# Patient Record
Sex: FEMALE | Race: WHITE | NOT HISPANIC OR LATINO | Employment: FULL TIME | URBAN - METROPOLITAN AREA
[De-identification: names, ages, dates, MRNs, and addresses within clinical notes are randomized per-mention and may not be internally consistent; named-entity substitution may affect disease eponyms.]

---

## 2021-12-11 ENCOUNTER — APPOINTMENT (EMERGENCY)
Dept: RADIOLOGY | Facility: HOSPITAL | Age: 61
End: 2021-12-11
Payer: COMMERCIAL

## 2021-12-11 ENCOUNTER — HOSPITAL ENCOUNTER (EMERGENCY)
Facility: HOSPITAL | Age: 61
Discharge: HOME | End: 2021-12-11
Attending: EMERGENCY MEDICINE
Payer: COMMERCIAL

## 2021-12-11 VITALS
DIASTOLIC BLOOD PRESSURE: 51 MMHG | BODY MASS INDEX: 22.13 KG/M2 | OXYGEN SATURATION: 100 % | SYSTOLIC BLOOD PRESSURE: 99 MMHG | WEIGHT: 141 LBS | TEMPERATURE: 98.6 F | RESPIRATION RATE: 16 BRPM | HEART RATE: 64 BPM | HEIGHT: 67 IN

## 2021-12-11 DIAGNOSIS — S42.291A CLOSED FRACTURE OF HEAD OF RIGHT HUMERUS, INITIAL ENCOUNTER: Primary | ICD-10-CM

## 2021-12-11 LAB
2HR DELTA HS TROPONIN: 0 NG/L
ALBUMIN SERPL BCP-MCNC: 4.5 G/DL (ref 3.5–5)
ALP SERPL-CCNC: 70 U/L (ref 46–116)
ALT SERPL W P-5'-P-CCNC: 26 U/L (ref 12–78)
ANION GAP SERPL CALCULATED.3IONS-SCNC: 7 MMOL/L (ref 4–13)
AST SERPL W P-5'-P-CCNC: 18 U/L (ref 5–45)
ATRIAL RATE: 64 BPM
BASOPHILS # BLD AUTO: 0.04 THOUSANDS/ΜL (ref 0–0.1)
BASOPHILS NFR BLD AUTO: 0 % (ref 0–1)
BILIRUB SERPL-MCNC: 1.53 MG/DL (ref 0.2–1)
BUN SERPL-MCNC: 11 MG/DL (ref 5–25)
CALCIUM SERPL-MCNC: 10.1 MG/DL (ref 8.3–10.1)
CARDIAC TROPONIN I PNL SERPL HS: 2 NG/L
CARDIAC TROPONIN I PNL SERPL HS: 2 NG/L
CHLORIDE SERPL-SCNC: 108 MMOL/L (ref 100–108)
CO2 SERPL-SCNC: 25 MMOL/L (ref 21–32)
CREAT SERPL-MCNC: 0.92 MG/DL (ref 0.6–1.3)
EOSINOPHIL # BLD AUTO: 0 THOUSAND/ΜL (ref 0–0.61)
EOSINOPHIL NFR BLD AUTO: 0 % (ref 0–6)
ERYTHROCYTE [DISTWIDTH] IN BLOOD BY AUTOMATED COUNT: 12.6 % (ref 11.6–15.1)
GFR SERPL CREATININE-BSD FRML MDRD: 67 ML/MIN/1.73SQ M
GLUCOSE SERPL-MCNC: 109 MG/DL (ref 65–140)
HCT VFR BLD AUTO: 42.2 % (ref 34.8–46.1)
HGB BLD-MCNC: 14.4 G/DL (ref 11.5–15.4)
IMM GRANULOCYTES # BLD AUTO: 0.04 THOUSAND/UL (ref 0–0.2)
IMM GRANULOCYTES NFR BLD AUTO: 0 % (ref 0–2)
LYMPHOCYTES # BLD AUTO: 0.63 THOUSANDS/ΜL (ref 0.6–4.47)
LYMPHOCYTES NFR BLD AUTO: 6 % (ref 14–44)
MCH RBC QN AUTO: 30.1 PG (ref 26.8–34.3)
MCHC RBC AUTO-ENTMCNC: 34.1 G/DL (ref 31.4–37.4)
MCV RBC AUTO: 88 FL (ref 82–98)
MONOCYTES # BLD AUTO: 0.56 THOUSAND/ΜL (ref 0.17–1.22)
MONOCYTES NFR BLD AUTO: 5 % (ref 4–12)
NEUTROPHILS # BLD AUTO: 10 THOUSANDS/ΜL (ref 1.85–7.62)
NEUTS SEG NFR BLD AUTO: 89 % (ref 43–75)
NRBC BLD AUTO-RTO: 0 /100 WBCS
P AXIS: 72 DEGREES
PLATELET # BLD AUTO: 267 THOUSANDS/UL (ref 149–390)
PMV BLD AUTO: 10.1 FL (ref 8.9–12.7)
POTASSIUM SERPL-SCNC: 3.7 MMOL/L (ref 3.5–5.3)
PR INTERVAL: 174 MS
PROT SERPL-MCNC: 7.8 G/DL (ref 6.4–8.2)
QRS AXIS: 22 DEGREES
QRSD INTERVAL: 66 MS
QT INTERVAL: 396 MS
QTC INTERVAL: 408 MS
RBC # BLD AUTO: 4.79 MILLION/UL (ref 3.81–5.12)
SODIUM SERPL-SCNC: 140 MMOL/L (ref 136–145)
T WAVE AXIS: 62 DEGREES
VENTRICULAR RATE: 64 BPM
WBC # BLD AUTO: 11.27 THOUSAND/UL (ref 4.31–10.16)

## 2021-12-11 PROCEDURE — 93005 ELECTROCARDIOGRAM TRACING: CPT

## 2021-12-11 PROCEDURE — 99284 EMERGENCY DEPT VISIT MOD MDM: CPT

## 2021-12-11 PROCEDURE — 84484 ASSAY OF TROPONIN QUANT: CPT | Performed by: EMERGENCY MEDICINE

## 2021-12-11 PROCEDURE — 96374 THER/PROPH/DIAG INJ IV PUSH: CPT

## 2021-12-11 PROCEDURE — 93010 ELECTROCARDIOGRAM REPORT: CPT | Performed by: INTERNAL MEDICINE

## 2021-12-11 PROCEDURE — 73060 X-RAY EXAM OF HUMERUS: CPT

## 2021-12-11 PROCEDURE — 80053 COMPREHEN METABOLIC PANEL: CPT | Performed by: EMERGENCY MEDICINE

## 2021-12-11 PROCEDURE — 73030 X-RAY EXAM OF SHOULDER: CPT

## 2021-12-11 PROCEDURE — 36415 COLL VENOUS BLD VENIPUNCTURE: CPT | Performed by: EMERGENCY MEDICINE

## 2021-12-11 PROCEDURE — NC001 PR NO CHARGE: Performed by: ORTHOPAEDIC SURGERY

## 2021-12-11 PROCEDURE — 99285 EMERGENCY DEPT VISIT HI MDM: CPT | Performed by: EMERGENCY MEDICINE

## 2021-12-11 PROCEDURE — 85025 COMPLETE CBC W/AUTO DIFF WBC: CPT | Performed by: EMERGENCY MEDICINE

## 2021-12-11 RX ORDER — FENTANYL CITRATE 50 UG/ML
50 INJECTION, SOLUTION INTRAMUSCULAR; INTRAVENOUS ONCE
Status: COMPLETED | OUTPATIENT
Start: 2021-12-11 | End: 2021-12-11

## 2021-12-11 RX ADMIN — FENTANYL CITRATE 50 MCG: 50 INJECTION INTRAMUSCULAR; INTRAVENOUS at 06:35

## 2021-12-16 ENCOUNTER — TELEPHONE (OUTPATIENT)
Dept: OBGYN CLINIC | Facility: CLINIC | Age: 61
End: 2021-12-16

## 2021-12-19 DIAGNOSIS — M25.511 CHRONIC RIGHT SHOULDER PAIN: Primary | ICD-10-CM

## 2021-12-19 DIAGNOSIS — G89.29 CHRONIC RIGHT SHOULDER PAIN: Primary | ICD-10-CM

## 2021-12-23 ENCOUNTER — HOSPITAL ENCOUNTER (OUTPATIENT)
Dept: RADIOLOGY | Facility: HOSPITAL | Age: 61
Discharge: HOME | End: 2021-12-23
Attending: ORTHOPAEDIC SURGERY
Payer: COMMERCIAL

## 2021-12-23 ENCOUNTER — OFFICE VISIT (OUTPATIENT)
Dept: OBGYN CLINIC | Facility: HOSPITAL | Age: 61
End: 2021-12-23
Payer: COMMERCIAL

## 2021-12-23 ENCOUNTER — TELEPHONE (OUTPATIENT)
Dept: OBGYN CLINIC | Facility: HOSPITAL | Age: 61
End: 2021-12-23

## 2021-12-23 VITALS
HEIGHT: 67 IN | SYSTOLIC BLOOD PRESSURE: 138 MMHG | BODY MASS INDEX: 22.13 KG/M2 | DIASTOLIC BLOOD PRESSURE: 93 MMHG | HEART RATE: 66 BPM | WEIGHT: 141 LBS

## 2021-12-23 DIAGNOSIS — S42.291A CLOSED FRACTURE OF HEAD OF RIGHT HUMERUS, INITIAL ENCOUNTER: Primary | ICD-10-CM

## 2021-12-23 DIAGNOSIS — M25.511 CHRONIC RIGHT SHOULDER PAIN: ICD-10-CM

## 2021-12-23 DIAGNOSIS — G89.29 CHRONIC RIGHT SHOULDER PAIN: ICD-10-CM

## 2021-12-23 PROCEDURE — 99203 OFFICE O/P NEW LOW 30 MIN: CPT | Performed by: ORTHOPAEDIC SURGERY

## 2021-12-23 PROCEDURE — 73030 X-RAY EXAM OF SHOULDER: CPT

## 2022-01-16 DIAGNOSIS — S42.291A CLOSED FRACTURE OF HEAD OF RIGHT HUMERUS, INITIAL ENCOUNTER: Primary | ICD-10-CM

## 2022-01-19 ENCOUNTER — OFFICE VISIT (OUTPATIENT)
Dept: OBGYN CLINIC | Facility: HOSPITAL | Age: 62
End: 2022-01-19
Payer: COMMERCIAL

## 2022-01-19 ENCOUNTER — HOSPITAL ENCOUNTER (OUTPATIENT)
Dept: RADIOLOGY | Facility: HOSPITAL | Age: 62
Discharge: HOME | End: 2022-01-19
Attending: ORTHOPAEDIC SURGERY
Payer: COMMERCIAL

## 2022-01-19 VITALS
SYSTOLIC BLOOD PRESSURE: 144 MMHG | WEIGHT: 141 LBS | HEART RATE: 61 BPM | HEIGHT: 67 IN | BODY MASS INDEX: 22.13 KG/M2 | DIASTOLIC BLOOD PRESSURE: 81 MMHG

## 2022-01-19 DIAGNOSIS — S42.291A CLOSED FRACTURE OF HEAD OF RIGHT HUMERUS, INITIAL ENCOUNTER: ICD-10-CM

## 2022-01-19 DIAGNOSIS — S42.291A CLOSED FRACTURE OF HEAD OF RIGHT HUMERUS, INITIAL ENCOUNTER: Primary | ICD-10-CM

## 2022-01-19 PROCEDURE — 99213 OFFICE O/P EST LOW 20 MIN: CPT | Performed by: ORTHOPAEDIC SURGERY

## 2022-01-19 PROCEDURE — 73030 X-RAY EXAM OF SHOULDER: CPT

## 2022-01-19 RX ORDER — LEVOTHYROXINE SODIUM 0.07 MG/1
75 TABLET ORAL DAILY
COMMUNITY

## 2022-01-19 NOTE — PROGRESS NOTES
Orthopaedics Office Visit - Follow Up Patient Visit    ASSESSMENT/PLAN:    Assessment:   Right proximal humerus fracture, date of injury 12/11/2021     Plan:   · X-rays were performed and reviewed today in the office which continue to demonstrate stable alignment of fracture as well as appropriate healing  · Continue with physical therapy per protocol, range of motion exercises with no restrictions   · Dc sling   · Weight bear as tolerated   · Follow up PRN    To Do Next Visit:  N/a    _____________________________________________________  CHIEF COMPLAINT:  Chief Complaint   Patient presents with    Right Shoulder - Follow-up         SUBJECTIVE:  Reina Shin is a 57 y o  female who presents the office today for follow-up evaluation of her right proximal humerus fracture sustained about 6 weeks ago  She states her pain is very well controlled and today and is a 1/10 on the numeric pain scale  She requires no medication for this  She has been attending physical therapy twice a week to work on her pendulum exercises and gentle range of motion  She is able to flex her shoulder to 90° no significant pain  She has also been working elbow and wrist exercises  She does currently live in [place] but transitioning to living in this area  She notes no new injuries  She denies any numbness or tingling today  She has been using a sling for comfort when needed  PAST MEDICAL HISTORY:  History reviewed  No pertinent past medical history  PAST SURGICAL HISTORY:  History reviewed  No pertinent surgical history  FAMILY HISTORY:  History reviewed  No pertinent family history  SOCIAL HISTORY:  Social History     Tobacco Use    Smoking status: Never Smoker    Smokeless tobacco: Never Used   Vaping Use    Vaping Use: Never used   Substance Use Topics    Alcohol use:  Yes    Drug use: Never       MEDICATIONS:    Current Outpatient Medications:     levothyroxine 75 mcg tablet, Take 75 mcg by mouth daily, Disp: , Rfl:     ALLERGIES:  Allergies   Allergen Reactions    Sulfa Antibiotics Rash     Whole body       REVIEW OF SYSTEMS:  MSK: as noted in HPI  Neuro: WNL  Pertinent items are otherwise noted in HPI  A comprehensive review of systems was otherwise negative  LABS:  HgA1c: No results found for: HGBA1C  BMP:   Lab Results   Component Value Date    CALCIUM 10 1 12/11/2021    K 3 7 12/11/2021    CO2 25 12/11/2021     12/11/2021    BUN 11 12/11/2021    CREATININE 0 92 12/11/2021     CBC: No components found for: CBC    _____________________________________________________  PHYSICAL EXAMINATION:  Vital signs: /81   Pulse 61   Ht 5' 7" (1 702 m)   Wt 64 kg (141 lb)   BMI 22 08 kg/m²   General: No acute distress, awake and alert  Psychiatric: Mood and affect appear appropriate  HEENT: Trachea Midline, No torticollis, no apparent facial trauma  Cardiovascular: No audible murmurs;  Extremities appear perfused  Pulmonary: No audible wheezing or stridor  Skin: No open lesions; see further details (if any) below    MUSCULOSKELETAL EXAMINATION:  Extremities:  Right upper extremity   Skin intact, ecchymosis resolving   Active flexion to 90 degrees   Active abduction to 30 degrees   Internal and external rotation as expected   Shoulder is moving as a unit   Able to make full fist   Brisk capillary refill noted to all digits        _____________________________________________________  STUDIES REVIEWED:  I personally reviewed the images and interpretation is as follows:   X-rays of the right shoulder obtained on 01/19/2022 demonstrate a proximal humerus fracture with callus formation and no increase in interval displacement    PROCEDURES PERFORMED:  Procedures     None today      Scribe Attestation    I,:  Tri Perez am acting as a scribe while in the presence of the attending physician :       I,:  Lizbet Medrano MD personally performed the services described in this documentation    as scribed in my presence :

## 2023-01-23 ENCOUNTER — OFFICE VISIT (OUTPATIENT)
Dept: FAMILY MEDICINE CLINIC | Facility: CLINIC | Age: 63
End: 2023-01-23

## 2023-01-23 VITALS
TEMPERATURE: 97.9 F | BODY MASS INDEX: 23.01 KG/M2 | HEART RATE: 88 BPM | WEIGHT: 146.6 LBS | OXYGEN SATURATION: 98 % | DIASTOLIC BLOOD PRESSURE: 80 MMHG | HEIGHT: 67 IN | SYSTOLIC BLOOD PRESSURE: 109 MMHG

## 2023-01-23 DIAGNOSIS — Z11.4 ENCOUNTER FOR SCREENING FOR HIV: ICD-10-CM

## 2023-01-23 DIAGNOSIS — E80.4 GILBERT'S SYNDROME: ICD-10-CM

## 2023-01-23 DIAGNOSIS — Z11.59 NEED FOR HEPATITIS C SCREENING TEST: ICD-10-CM

## 2023-01-23 DIAGNOSIS — Z97.3 WEARS GLASSES: ICD-10-CM

## 2023-01-23 DIAGNOSIS — N03.2 CHRONIC MEMBRANOUS GLOMERULONEPHRITIS: ICD-10-CM

## 2023-01-23 DIAGNOSIS — Z78.9 VEGAN DIET: ICD-10-CM

## 2023-01-23 DIAGNOSIS — Z00.00 ANNUAL PHYSICAL EXAM: Primary | ICD-10-CM

## 2023-01-23 DIAGNOSIS — E03.8 OTHER SPECIFIED HYPOTHYROIDISM: ICD-10-CM

## 2023-01-23 DIAGNOSIS — Z12.31 ENCOUNTER FOR SCREENING MAMMOGRAM FOR MALIGNANT NEOPLASM OF BREAST: ICD-10-CM

## 2023-01-23 DIAGNOSIS — Z13.820 OSTEOPOROSIS SCREENING: ICD-10-CM

## 2023-01-23 DIAGNOSIS — Z12.11 COLON CANCER SCREENING: ICD-10-CM

## 2023-01-23 RX ORDER — LEVOTHYROXINE SODIUM 0.07 MG/1
75 TABLET ORAL DAILY
Qty: 90 TABLET | Refills: 3 | Status: SHIPPED | OUTPATIENT
Start: 2023-01-23 | End: 2023-04-23

## 2023-01-23 NOTE — PROGRESS NOTES
Family Medicine Follow-Up Office Visit  Sathish Adams 58 y o  female   MRN: [id number] : 1960  ENCOUNTER: 3/10/2023 4:25 PM    Assessment and Plan   No problem-specific Assessment & Plan notes found for this encounter  Chief Complaint     Chief Complaint   Patient presents with   • lifestlye meds       History of Present Illness   Sathish Adams is a 58y o -year-old female who presents today for ***    Dental - every 6 months due  Glasses - Opthomology    New patient    Lifestyle medicine interest      Nutrition - Whole food plant based    Physical Activity - Walk, 7500 steps per day  Environmental science stuff  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 [place]  Substances -     Stress - Son addict  Helping  Recovery for 3 years  Mental health issues  Chi-gung every morning and at break  Sleep - 7-8 hours per night  Relationships -     Review of Systems   Review of Systems    Active Problem List     Patient Active Problem List   Diagnosis   • Other specified hypothyroidism   • Gilbert's syndrome   • Elevated LDL cholesterol level   • CKD (chronic kidney disease) stage 2, GFR 60-89 ml/min       Past Medical History, Past Surgical History, Family History, and Social History were reviewed and updated today as appropriate      Objective   /80 (BP Location: Right arm, Patient Position: Sitting, Cuff Size: Standard)   Pulse 88   Temp 97 9 °F (36 6 °C) (Tympanic)   Ht 5' 7" (1 702 m)   Wt 66 5 kg (146 lb 9 6 oz)   SpO2 98%   BMI 22 96 kg/m²     Physical Exam  Diabetic Foot Exam    Pertinent Laboratory/Diagnostic Studies:  Lab Results   Component Value Date    BUN 9 2023    CREATININE 0 73 2023    CALCIUM 9 4 2023    K 4 2 2023    CO2 27 2023     2023     Lab Results   Component Value Date    ALT 13 2023    AST 15 2023    ALKPHOS 52 2023       Lab Results   Component Value Date    WBC 5 14 2023    HGB 13 0 2023 HCT 39 2 01/24/2023    MCV 87 01/24/2023     01/24/2023       No results found for: TSH    No results found for: CHOL  Lab Results   Component Value Date    TRIG 88 01/24/2023     Lab Results   Component Value Date    HDL 55 01/24/2023     Lab Results   Component Value Date    LDLCALC 110 (H) 01/24/2023     Lab Results   Component Value Date    HGBA1C 4 6 01/18/2022       Results for orders placed or performed in visit on 01/23/23   UA (URINE) with reflex to Scope   Result Value Ref Range    Color, UA Light Yellow     Clarity, UA Clear     Specific [place], UA 1 014 1 003 - 1 030    pH, UA 7 5 4 5, 5 0, 5 5, 6 0, 6 5, 7 0, 7 5, 8 0    Leukocytes, UA Negative Negative    Nitrite, UA Negative Negative    Protein, UA Negative Negative mg/dl    Glucose, UA Negative Negative mg/dl    Ketones, UA Negative Negative mg/dl    Urobilinogen, UA <2 0 <2 0 mg/dl mg/dl    Bilirubin, UA Negative Negative    Occult Blood, UA Negative Negative       Orders Placed This Encounter   Procedures   • DXA bone density spine hip and pelvis   • Mammo screening bilateral w 3d & cad   • Hepatic function panel   • Basic metabolic panel   • CBC and differential   • Lipid panel   • T4, free   • Vitamin B12   • Vitamin D 25 hydroxy   • TSH, 3rd generation   • UA (URINE) with reflex to Scope   • HIV-1/HIV-2 Qualitative RNA   • Hepatitis C antibody   • Ambulatory referral for colonoscopy   • Ambulatory Referral to Ophthalmology         Current Medications     Current Outpatient Medications   Medication Sig Dispense Refill   • levothyroxine 75 mcg tablet Take 1 tablet (75 mcg total) by mouth daily 90 tablet 3     No current facility-administered medications for this visit         ALLERGIES:  Allergies   Allergen Reactions   • Sulfa Antibiotics Rash     Whole body       Health Maintenance     Health Maintenance   Topic Date Due   • Annual Physical  Never done   • Cervical Cancer Screening  Never done   • Breast Cancer Screening: Mammogram  Never done   • Colorectal Cancer Screening  01/18/2023   • DXA SCAN  12/01/2023   • Depression Screening  01/23/2024   • BMI: Adult  02/24/2024   • DTaP,Tdap,and Td Vaccines (2 - Td or Tdap) 11/28/2028   • HIV Screening  Completed   • Hepatitis C Screening  Completed   • Influenza Vaccine  Completed   • COVID-19 Vaccine  Completed   • Pneumococcal Vaccine: Pediatrics (0 to 5 Years) and At-Risk Patients (6 to 59 Years)  Aged Out   • HIB Vaccine  Aged Out   • IPV Vaccine  Aged Out   • Hepatitis A Vaccine  Aged Out   • Meningococcal ACWY Vaccine  Aged Out   • HPV Vaccine  Aged [employer] History   Administered Date(s) Administered   • COVID-19 MODERNA VACC 0 5 ML IM 03/26/2021, 04/23/2021, 12/12/2021   • COVID-19 Pfizer Vac BIVALENT Shailesh-sucrose 12 Yr+ IM (BOOSTER ONLY) 12/12/2022   • INFLUENZA 11/02/2021, 10/31/2022   • Tdap 11/28/2018         Brayan Blood DO   [address]  3/10/2023  4:25 PM    Parts of this note were dictated using [employer] dictation software and may have sounds-like errors due to variation in pronunciation

## 2023-01-24 ENCOUNTER — APPOINTMENT (OUTPATIENT)
Dept: LAB | Facility: CLINIC | Age: 63
End: 2023-01-24

## 2023-01-24 DIAGNOSIS — E03.8 OTHER SPECIFIED HYPOTHYROIDISM: ICD-10-CM

## 2023-01-24 DIAGNOSIS — E80.4 GILBERT'S SYNDROME: ICD-10-CM

## 2023-01-24 DIAGNOSIS — Z11.59 NEED FOR HEPATITIS C SCREENING TEST: ICD-10-CM

## 2023-01-24 DIAGNOSIS — Z78.9 VEGAN DIET: ICD-10-CM

## 2023-01-24 DIAGNOSIS — Z11.4 ENCOUNTER FOR SCREENING FOR HIV: ICD-10-CM

## 2023-01-24 LAB
25(OH)D3 SERPL-MCNC: 36.2 NG/ML (ref 30–100)
ALBUMIN SERPL BCP-MCNC: 4.3 G/DL (ref 3.5–5)
ALP SERPL-CCNC: 52 U/L (ref 34–104)
ALT SERPL W P-5'-P-CCNC: 13 U/L (ref 7–52)
ANION GAP SERPL CALCULATED.3IONS-SCNC: 6 MMOL/L (ref 4–13)
AST SERPL W P-5'-P-CCNC: 15 U/L (ref 13–39)
BASOPHILS # BLD AUTO: 0.05 THOUSANDS/ÂΜL (ref 0–0.1)
BASOPHILS NFR BLD AUTO: 1 % (ref 0–1)
BILIRUB DIRECT SERPL-MCNC: 0.19 MG/DL (ref 0–0.2)
BILIRUB SERPL-MCNC: 1.46 MG/DL (ref 0.2–1)
BILIRUB UR QL STRIP: NEGATIVE
BUN SERPL-MCNC: 9 MG/DL (ref 5–25)
CALCIUM SERPL-MCNC: 9.4 MG/DL (ref 8.4–10.2)
CHLORIDE SERPL-SCNC: 104 MMOL/L (ref 96–108)
CHOLEST SERPL-MCNC: 183 MG/DL
CLARITY UR: CLEAR
CO2 SERPL-SCNC: 27 MMOL/L (ref 21–32)
COLOR UR: NORMAL
CREAT SERPL-MCNC: 0.73 MG/DL (ref 0.6–1.3)
EOSINOPHIL # BLD AUTO: 0.08 THOUSAND/ÂΜL (ref 0–0.61)
EOSINOPHIL NFR BLD AUTO: 2 % (ref 0–6)
ERYTHROCYTE [DISTWIDTH] IN BLOOD BY AUTOMATED COUNT: 12.4 % (ref 11.6–15.1)
FERRITIN SERPL-MCNC: 86 NG/ML (ref 8–388)
GFR SERPL CREATININE-BSD FRML MDRD: 87 ML/MIN/1.73SQ M
GLUCOSE P FAST SERPL-MCNC: 89 MG/DL (ref 65–99)
GLUCOSE UR STRIP-MCNC: NEGATIVE MG/DL
HCT VFR BLD AUTO: 39.2 % (ref 34.8–46.1)
HCV AB SER QL: NORMAL
HDLC SERPL-MCNC: 55 MG/DL
HGB BLD-MCNC: 13 G/DL (ref 11.5–15.4)
HGB UR QL STRIP.AUTO: NEGATIVE
IMM GRANULOCYTES # BLD AUTO: 0.01 THOUSAND/UL (ref 0–0.2)
IMM GRANULOCYTES NFR BLD AUTO: 0 % (ref 0–2)
IRON SATN MFR SERPL: 27 % (ref 15–50)
IRON SERPL-MCNC: 76 UG/DL (ref 50–170)
KETONES UR STRIP-MCNC: NEGATIVE MG/DL
LDLC SERPL CALC-MCNC: 110 MG/DL (ref 0–100)
LEUKOCYTE ESTERASE UR QL STRIP: NEGATIVE
LYMPHOCYTES # BLD AUTO: 1.9 THOUSANDS/ÂΜL (ref 0.6–4.47)
LYMPHOCYTES NFR BLD AUTO: 37 % (ref 14–44)
MCH RBC QN AUTO: 28.9 PG (ref 26.8–34.3)
MCHC RBC AUTO-ENTMCNC: 33.2 G/DL (ref 31.4–37.4)
MCV RBC AUTO: 87 FL (ref 82–98)
MONOCYTES # BLD AUTO: 0.46 THOUSAND/ÂΜL (ref 0.17–1.22)
MONOCYTES NFR BLD AUTO: 9 % (ref 4–12)
NEUTROPHILS # BLD AUTO: 2.64 THOUSANDS/ÂΜL (ref 1.85–7.62)
NEUTS SEG NFR BLD AUTO: 51 % (ref 43–75)
NITRITE UR QL STRIP: NEGATIVE
NONHDLC SERPL-MCNC: 128 MG/DL
NRBC BLD AUTO-RTO: 0 /100 WBCS
PH UR STRIP.AUTO: 7.5 [PH]
PLATELET # BLD AUTO: 242 THOUSANDS/UL (ref 149–390)
PMV BLD AUTO: 9.6 FL (ref 8.9–12.7)
POTASSIUM SERPL-SCNC: 4.2 MMOL/L (ref 3.5–5.3)
PROT SERPL-MCNC: 6.5 G/DL (ref 6.4–8.4)
PROT UR STRIP-MCNC: NEGATIVE MG/DL
RBC # BLD AUTO: 4.5 MILLION/UL (ref 3.81–5.12)
SODIUM SERPL-SCNC: 137 MMOL/L (ref 135–147)
SP GR UR STRIP.AUTO: 1.01 (ref 1–1.03)
T4 FREE SERPL-MCNC: 1.19 NG/DL (ref 0.76–1.46)
TIBC SERPL-MCNC: 283 UG/DL (ref 250–450)
TRIGL SERPL-MCNC: 88 MG/DL
TSH SERPL DL<=0.05 MIU/L-ACNC: 1.63 UIU/ML (ref 0.45–4.5)
UROBILINOGEN UR STRIP-ACNC: <2 MG/DL
VIT B12 SERPL-MCNC: 492 PG/ML (ref 100–900)
WBC # BLD AUTO: 5.14 THOUSAND/UL (ref 4.31–10.16)

## 2023-01-26 LAB — HIV 1+2 AB+HIV1 P24 AG SERPL QL IA: NON REACTIVE

## 2023-02-24 ENCOUNTER — OFFICE VISIT (OUTPATIENT)
Dept: FAMILY MEDICINE CLINIC | Facility: CLINIC | Age: 63
End: 2023-02-24

## 2023-02-24 VITALS
SYSTOLIC BLOOD PRESSURE: 122 MMHG | OXYGEN SATURATION: 99 % | WEIGHT: 148 LBS | HEART RATE: 66 BPM | HEIGHT: 67 IN | DIASTOLIC BLOOD PRESSURE: 84 MMHG | BODY MASS INDEX: 23.23 KG/M2

## 2023-02-24 DIAGNOSIS — E03.8 OTHER SPECIFIED HYPOTHYROIDISM: ICD-10-CM

## 2023-02-24 DIAGNOSIS — N18.2 CKD (CHRONIC KIDNEY DISEASE) STAGE 2, GFR 60-89 ML/MIN: ICD-10-CM

## 2023-02-24 DIAGNOSIS — E78.00 ELEVATED LDL CHOLESTEROL LEVEL: Primary | ICD-10-CM

## 2023-02-24 DIAGNOSIS — E80.4 GILBERT'S SYNDROME: ICD-10-CM

## 2023-02-24 PROBLEM — S42.291A CLOSED FRACTURE OF HEAD OF RIGHT HUMERUS: Status: RESOLVED | Noted: 2021-12-23 | Resolved: 2023-02-24

## 2023-02-24 NOTE — PROGRESS NOTES
Family Medicine Follow-Up Office Visit  Jagruti Woodard 58 y o  female   MRN: [id number] : 1960  ENCOUNTER: 2023 2:47 PM    Assessment and Plan   Elevated LDL cholesterol level  Lab Results   Component Value Date    CHOLESTEROL 183 2023    TRIG 88 2023    HDL 55 2023    LDLCALC 110 (H) 2023     · Patient with elevated LDL in the setting of recent move and indiscretions from whole food plant based diet  · Patient has been more consistent with her diet over the past month  · Plan to continue with lifestyle intervention with plan to repeat labs and follow up in 3-4 months    Gilbert's syndrome  Patient with slight indirect bilirubinemia consistent with history of Gilbert's syndrome  CKD (chronic kidney disease) stage 2, GFR 60-89 ml/min  Lab Results   Component Value Date    EGFR 87 2023    EGFR 67 2021    CREATININE 0 73 2023    CREATININE 0 92 2021     · Improvement from labs 2 years ago  · Continue with yearly screening    Other specified hypothyroidism  Lab Results   Component Value Date    [id number] 1 630 2023    FREET4 1 19 2023       · WNL  Continue with routine monitoring annually    Nutrition Assessment and Intervention:     Reviewed food recall journal    New Nutrition Prescription completed with patient      Physical Activity Assessment and Intervention:    Activity journal reviewed        Chief Complaint   No chief complaint on file  History of Present Illness   Jagruti Woodard is a 58y o -year-old female with past medical history of Guilbert syndrome, hypothyroidism, and whole food plant-based vegan diet who presents today for review of laboratory results  Due to history of prior diagnosis with chronic membranous glomerulonephritis urinalysis is ordered with normal findings  We will plan to repeat urinalysis yearly      BMP and hepatic function panel reviewed with indirect bilirubinemia noted consistent with known Guilbert syndrome  Kidney function is relatively normal at stage II CKD  Lipid panel is overall encouraging with normal range for total cholesterol, triglycerides, and HDL cholesterol  LDL cholesterol slightly elevated at 101  Patient does note some recent dietary indiscretions prior to laboratory work-up due to move and she has already made some lifestyle changes in the past month to account for this  Including cutting out cooking oils  ASCVD risk 4 3% - no indication for medication management at this time  HIV and Hep C screening negative  Vitamin B12 and D within normal limits  Vitamin D is low normal at 36 2 - could consider increased supplement to 2500 IU from 1500 IU or increased sunlight  TSH and T4 within normal limits  CBC and Iron panel within normal limits  Review of Systems   Review of Systems   Constitutional: Negative for fatigue and fever  HENT: Negative for congestion and sore throat  Respiratory: Negative for cough and shortness of breath  Cardiovascular: Negative for chest pain and palpitations  Gastrointestinal: Negative for abdominal pain, blood in stool, constipation, diarrhea, nausea and vomiting  Genitourinary: Negative for dysuria and hematuria  Musculoskeletal: Negative for arthralgias and myalgias  Skin: Negative for rash  Neurological: Negative for dizziness and headaches  Psychiatric/Behavioral: Negative for dysphoric mood  The patient is not nervous/anxious  Active Problem List     Patient Active Problem List   Diagnosis   • Other specified hypothyroidism   • Gilbert's syndrome   • Elevated LDL cholesterol level   • CKD (chronic kidney disease) stage 2, GFR 60-89 ml/min       Past Medical History, Past Surgical History, Family History, and Social History were reviewed and updated today as appropriate      Objective   /84 (BP Location: Left arm, Patient Position: Sitting, Cuff Size: Standard)   Pulse 66   Ht 5' 7" (1 702 m)   Wt 67 1 kg (148 lb)   SpO2 99%   BMI 23 18 kg/m²     Physical Exam  Vitals reviewed  Constitutional:       General: She is not in acute distress  Appearance: Normal appearance  She is not ill-appearing  HENT:      Head: Normocephalic and atraumatic  Right Ear: External ear normal       Left Ear: External ear normal    Eyes:      General: No scleral icterus  Conjunctiva/sclera: Conjunctivae normal    Cardiovascular:      Rate and Rhythm: Normal rate  Pulmonary:      Effort: Pulmonary effort is normal  No respiratory distress  Musculoskeletal:         General: Normal range of motion  Cervical back: Normal range of motion  Neurological:      General: No focal deficit present  Mental Status: She is alert and oriented to person, place, and time     Psychiatric:         Mood and Affect: Mood normal          Behavior: Behavior normal          Pertinent Laboratory/Diagnostic Studies:  Lab Results   Component Value Date    BUN 9 01/24/2023    CREATININE 0 73 01/24/2023    CALCIUM 9 4 01/24/2023    K 4 2 01/24/2023    CO2 27 01/24/2023     01/24/2023     Lab Results   Component Value Date    ALT 13 01/24/2023    AST 15 01/24/2023    ALKPHOS 52 01/24/2023       Lab Results   Component Value Date    WBC 5 14 01/24/2023    HGB 13 0 01/24/2023    HCT 39 2 01/24/2023    MCV 87 01/24/2023     01/24/2023       No results found for: TSH    No results found for: CHOL  Lab Results   Component Value Date    TRIG 88 01/24/2023     Lab Results   Component Value Date    HDL 55 01/24/2023     Lab Results   Component Value Date    LDLCALC 110 (H) 01/24/2023     Lab Results   Component Value Date    HGBA1C 4 6 01/18/2022       Results for orders placed or performed in visit on 01/24/23   Hepatic function panel   Result Value Ref Range    Total Bilirubin 1 46 (H) 0 20 - 1 00 mg/dL    Bilirubin, Direct 0 19 0 00 - 0 20 mg/dL    Alkaline Phosphatase 52 34 - 104 U/L    AST 15 13 - 39 U/L    ALT 13 7 - 52 U/L    Total Protein 6 5 6 4 - 8 4 g/dL    Albumin 4 3 3 5 - 5 0 g/dL   Basic metabolic panel   Result Value Ref Range    Sodium 137 135 - 147 mmol/L    Potassium 4 2 3 5 - 5 3 mmol/L    Chloride 104 96 - 108 mmol/L    CO2 27 21 - 32 mmol/L    ANION GAP 6 4 - 13 mmol/L    BUN 9 5 - 25 mg/dL    Creatinine 0 73 0 60 - 1 30 mg/dL    Glucose, Fasting 89 65 - 99 mg/dL    Calcium 9 4 8 4 - 10 2 mg/dL    eGFR 87 ml/min/1 73sq m   CBC and differential   Result Value Ref Range    WBC 5 14 4 31 - 10 16 Thousand/uL    RBC 4 50 3 81 - 5 12 Million/uL    Hemoglobin 13 0 11 5 - 15 4 g/dL    Hematocrit 39 2 34 8 - 46 1 %    MCV 87 82 - 98 fL    MCH 28 9 26 8 - 34 3 pg    MCHC 33 2 31 4 - 37 4 g/dL    RDW 12 4 11 6 - 15 1 %    MPV 9 6 8 9 - 12 7 fL    Platelets 762 122 - 300 Thousands/uL    nRBC 0 /100 WBCs    Neutrophils Relative 51 43 - 75 %    Immat GRANS % 0 0 - 2 %    Lymphocytes Relative 37 14 - 44 %    Monocytes Relative 9 4 - 12 %    Eosinophils Relative 2 0 - 6 %    Basophils Relative 1 0 - 1 %    Neutrophils Absolute 2 64 1 85 - 7 62 Thousands/µL    Immature Grans Absolute 0 01 0 00 - 0 20 Thousand/uL    Lymphocytes Absolute 1 90 0 60 - 4 47 Thousands/µL    Monocytes Absolute 0 46 0 17 - 1 22 Thousand/µL    Eosinophils Absolute 0 08 0 00 - 0 61 Thousand/µL    Basophils Absolute 0 05 0 00 - 0 10 Thousands/µL   Lipid panel   Result Value Ref Range    Cholesterol 183 See Comment mg/dL    Triglycerides 88 See Comment mg/dL    HDL, Direct 55 >=50 mg/dL    LDL Calculated 110 (H) 0 - 100 mg/dL    Non-HDL-Chol (CHOL-HDL) 128 mg/dl   T4, free   Result Value Ref Range    Free T4 1 19 0 76 - 1 46 ng/dL   Vitamin B12   Result Value Ref Range    Vitamin B-12 492 100 - 900 pg/mL   Vitamin D 25 hydroxy   Result Value Ref Range    Vit D, 25-Hydroxy 36 2 30 0 - 100 0 ng/mL   TSH, 3rd generation   Result Value Ref Range    TSH 3RD GENERATON 1 630 0 450 - 4 500 uIU/mL   Hepatitis C antibody   Result Value Ref Range    Hepatitis C Ab Non-reactive Non-reactive   Iron Saturation %   Result Value Ref Range    Iron Saturation 27 15 - 50 %    TIBC 283 250 - 450 ug/dL    Iron 76 50 - 170 ug/dL   Ferritin   Result Value Ref Range    Ferritin 86 8 - 388 ng/mL   Human Immunodeficiency Virus 1/2 Antigen / Antibody ( Fourth Generation) with Reflex Testing   Result Value Ref Range    HIV Screen 4th Generation wRflx Non Reactive Non Reactive       Orders Placed This Encounter   Procedures   • Lipid panel         Current Medications     Current Outpatient Medications   Medication Sig Dispense Refill   • levothyroxine 75 mcg tablet Take 1 tablet (75 mcg total) by mouth daily 90 tablet 3     No current facility-administered medications for this visit         ALLERGIES:  Allergies   Allergen Reactions   • Sulfa Antibiotics Rash     Whole body       Health Maintenance     Health Maintenance   Topic Date Due   • Annual Physical  Never done   • Cervical Cancer Screening  Never done   • Breast Cancer Screening: Mammogram  Never done   • Colorectal Cancer Screening  01/18/2023   • COVID-19 Vaccine (4 - Booster for Michelle Ovalleses series) 02/06/2023   • DXA SCAN  12/01/2023   • Depression Screening  01/23/2024   • BMI: Adult  02/24/2024   • DTaP,Tdap,and Td Vaccines (2 - Td or Tdap) 11/28/2028   • HIV Screening  Completed   • Hepatitis C Screening  Completed   • Osteoporosis Screening  Completed   • Influenza Vaccine  Completed   • Pneumococcal Vaccine: Pediatrics (0 to 5 Years) and At-Risk Patients (6 to 59 Years)  Aged Out   • HIB Vaccine  Aged Out   • IPV Vaccine  Aged Out   • Hepatitis A Vaccine  Aged Out   • Meningococcal ACWY Vaccine  Aged Out   • HPV Vaccine  Aged [employer] History   Administered Date(s) Administered   • COVID-19 MODERNA VACC 0 5 ML IM 03/26/2021, 04/23/2021, 12/12/2021   • COVID-19 Pfizer Vac BIVALENT Shailesh-sucrose 12 Yr+ IM (BOOSTER ONLY) 12/12/2022   • INFLUENZA 11/02/2021, 10/31/2022   • Tdap 11/28/2018         DO Tavon Buchanan  [address]  2/24/2023  2:47 PM    Parts of this note were dictated using [employer] dictation software and may have sounds-like errors due to variation in pronunciation

## 2023-02-24 NOTE — ASSESSMENT & PLAN NOTE
Lab Results   Component Value Date    EGFR 87 01/24/2023    EGFR 67 12/11/2021    CREATININE 0 73 01/24/2023    CREATININE 0 92 12/11/2021     · Improvement from labs 2 years ago  · Continue with yearly screening

## 2023-02-24 NOTE — ASSESSMENT & PLAN NOTE
Lab Results   Component Value Date    CHOLESTEROL 183 01/24/2023    TRIG 88 01/24/2023    HDL 55 01/24/2023    LDLCALC 110 (H) 01/24/2023     · Patient with elevated LDL in the setting of recent move and indiscretions from whole food plant based diet  · Patient has been more consistent with her diet over the past month  · Plan to continue with lifestyle intervention with plan to repeat labs and follow up in 3-4 months

## 2023-02-24 NOTE — ASSESSMENT & PLAN NOTE
Lab Results   Component Value Date    [id number] 1 630 01/24/2023    FREET4 1 19 01/24/2023       · WNL  · Continue with routine monitoring annually

## 2023-02-28 ENCOUNTER — TELEPHONE (OUTPATIENT)
Dept: GASTROENTEROLOGY | Facility: CLINIC | Age: 63
End: 2023-02-28

## 2023-02-28 ENCOUNTER — PREP FOR PROCEDURE (OUTPATIENT)
Dept: GASTROENTEROLOGY | Facility: CLINIC | Age: 63
End: 2023-02-28

## 2023-02-28 DIAGNOSIS — Z12.11 SCREENING FOR COLON CANCER: Primary | ICD-10-CM

## 2023-02-28 NOTE — TELEPHONE ENCOUNTER
Scheduled date of colonoscopy (as of today): 5/11/23  Physician performing colonoscopy: MARY ANN  Location of colonoscopy: [facility]

## 2023-03-10 PROBLEM — Z78.9 VEGAN DIET: Status: ACTIVE | Noted: 2023-03-10

## 2023-03-10 PROBLEM — N03.2 CHRONIC MEMBRANOUS GLOMERULONEPHRITIS: Status: ACTIVE | Noted: 2023-03-10

## 2023-03-10 PROBLEM — Z00.00 ANNUAL PHYSICAL EXAM: Status: ACTIVE | Noted: 2023-03-10

## 2023-03-10 NOTE — ASSESSMENT & PLAN NOTE
Patient with history of this    · Hepatic function panel ordered to monitor this for initial evaluation

## 2023-03-10 NOTE — ASSESSMENT & PLAN NOTE
Patient with reported history of this on biopsy though has not presented an issue for her    Plan:  · BMP, CBC, and UA for monitoring

## 2023-03-10 NOTE — PROGRESS NOTES
[address] [facility] [place]    NAME: Preeti Block  AGE: 58 y o  SEX: female  : 1960     DATE: 3/10/2023     Assessment and Plan:     1  Annual physical exam  Assessment & Plan:  · Patient is referred for colonoscopy as she is due for repeat  · Mammogram  · Referral to ophthalmology in the area to establish care following move  · Screening Dexa scan ordered  · Hep C and HIV ordered for screening  · Screening lipid panel      2  Vegan diet  Assessment & Plan:  · Iron panel ordered for further monitoring as well as Vitamin B12 and Vitamin D  · Patient is counseled that Vitamin D testing may or may not be covered by insurance    Orders:  -     Hepatic function panel; Future  -     Basic metabolic panel; Future  -     CBC and differential; Future  -     Vitamin B12; Future  -     Vitamin D 25 hydroxy; Future  -     Iron Panel (Includes Ferritin, Iron Sat%, Iron, and TIBC); Future    3  Gilbert's syndrome  Assessment & Plan:  Patient with history of this    · Hepatic function panel ordered to monitor this for initial evaluation    Orders:  -     Hepatic function panel; Future  -     Basic metabolic panel; Future  -     CBC and differential; Future    4  Other specified hypothyroidism  Assessment & Plan:  Levothyroxine refill provided    TSH and free T4 ordered for routine monitoring    Orders:  -     Lipid panel; Future  -     T4, free; Future  -     TSH, 3rd generation; Future  -     levothyroxine 75 mcg tablet; Take 1 tablet (75 mcg total) by mouth daily    5  Colon cancer screening  -     Ambulatory referral for colonoscopy; Future    6  Osteoporosis screening  -     DXA bone density spine hip and pelvis; Future; Expected date: 2023    7  Encounter for screening mammogram for malignant neoplasm of breast  -     Mammo screening bilateral w 3d & cad; Future; Expected date: 2023    8   Chronic membranous glomerulonephritis  Assessment & Plan:  Patient with reported history of this on biopsy though has not presented an issue for her    Plan:  · BMP, CBC, and UA for monitoring    Orders:  -     UA (URINE) with reflex to Scope    9  Wears glasses  -     Ambulatory Referral to Ophthalmology; Future    10  Need for hepatitis C screening test  -     Hepatitis C antibody; Future    11  Encounter for screening for HIV  -     HIV-1/HIV-2 Qualitative RNA; Future      Immunizations and preventive care screenings were discussed with patient today  Appropriate education was printed on patient's after visit summary  Counseling:  Dental Health: discussed importance of regular tooth brushing, flossing, and dental visits  · Exercise: the importance of regular exercise/physical activity was discussed  Recommend exercise 3-5 times per week for at least 30 minutes  Nutrition Assessment and Intervention:     Reviewed food recall journal      Physical Activity Assessment and Intervention:    Activity journal reviewed      Emotional and Mental Well-being, Sleep, Connectedness Assessment and Intervention:    Sleep/stress assessment performed    Depression and anxiety screening performed and reviewed      Tobacco and Toxic Substance Assessment and Intervention:     Tobacco use screening performed    Alcohol and drug use screening performed        No follow-ups on file  Chief Complaint:     Chief Complaint   Patient presents with   • lifestlye meds      History of Present Illness:     Adult Annual Physical   Patient here for a comprehensive physical exam  The patient reports no problems  Willard Sears is a 58y o -year-old female with PMHx of hypothyroidism who presents today for annual physical and to establish care with our office  She has moved from [place] with her previous health care at [facility]  She expresses specific interest in our lifestyle medicine training  She works in environmental science  Stress - She does report that her main stressors are at home with helping her son who is recovering from addiction and struggles with mental health issues  She does [employer]-g[employer] every morning and at break to help with stress management  Diet and Physical Activity  · Diet/Nutrition: Whole food plant based diet  · Exercise: Walks 7500 steps a day  Depression Screening  PHQ-2/9 Depression Screening    Little interest or pleasure in doing things: 0 - not at all  Feeling down, depressed, or hopeless: 0 - not at all  PHQ-2 Score: 0  PHQ-2 Interpretation: Negative depression screen       General Health  · Sleep: gets 7-8 hours of sleep on average  · Hearing: normal - bilateral   · Vision: goes for regular eye exams and wears glasses  · Dental: regular dental visits  /GYN Health  · Patient is: postmenopausal     Review of Systems:     Review of Systems   Constitutional: Negative for fatigue and fever  HENT: Negative for congestion and sore throat  Respiratory: Negative for cough and shortness of breath  Cardiovascular: Negative for chest pain and palpitations  Gastrointestinal: Negative for abdominal pain, blood in stool, constipation, diarrhea, nausea and vomiting  Genitourinary: Negative for dysuria and hematuria  Musculoskeletal: Negative for arthralgias and myalgias  Skin: Negative for rash  Neurological: Negative for dizziness and headaches  Psychiatric/Behavioral: Negative for dysphoric mood  The patient is not nervous/anxious  Past Medical History:     History reviewed  No pertinent past medical history     Past Surgical History:     Past Surgical History:   Procedure Laterality Date   • HYSTERECTOMY      2018      Social History:     Social History     Socioeconomic History   • Marital status: /Civil Union     Spouse name: None   • Number of children: None   • Years of education: None   • Highest education level: None   Occupational History   • None   Tobacco Use   • Smoking status: Never   • Smokeless tobacco: Never   Vaping Use   • Vaping Use: Never used   Substance and Sexual Activity   • Alcohol use: Yes     Comment: occassional   • Drug use: Never   • Sexual activity: None   Other Topics Concern   • None   Social History Narrative   • None     Social Determinants of Health     Financial Resource Strain: Not on file   Food Insecurity: Not on file   Transportation Needs: Not on file   Physical Activity: Not on file   Stress: Not on file   Social Connections: Not on file   Intimate Partner Violence: Not on file   Housing Stability: Not on file      Family History:     History reviewed  No pertinent family history  Current Medications:     Current Outpatient Medications   Medication Sig Dispense Refill   • levothyroxine 75 mcg tablet Take 1 tablet (75 mcg total) by mouth daily 90 tablet 3     No current facility-administered medications for this visit  Allergies: Allergies   Allergen Reactions   • Sulfa Antibiotics Rash     Whole body      Physical Exam:     /80 (BP Location: Right arm, Patient Position: Sitting, Cuff Size: Standard)   Pulse 88   Temp 97 9 °F (36 6 °C) (Tympanic)   Ht 5' 7" (1 702 m)   Wt 66 5 kg (146 lb 9 6 oz)   SpO2 98%   BMI 22 96 kg/m²     Physical Exam  Vitals reviewed  Constitutional:       General: She is not in acute distress  Appearance: Normal appearance  She is well-developed  She is not ill-appearing  HENT:      Head: Normocephalic and atraumatic  Right Ear: Tympanic membrane, ear canal and external ear normal       Left Ear: Tympanic membrane, ear canal and external ear normal       Nose: Nose normal       Mouth/Throat:      Mouth: Mucous membranes are moist       Pharynx: Oropharynx is clear  Eyes:      General: No scleral icterus  Right eye: No discharge  Left eye: No discharge  Conjunctiva/sclera: Conjunctivae normal       Pupils: Pupils are equal, round, and reactive to light  Cardiovascular:      Rate and Rhythm: Normal rate and regular rhythm  Heart sounds: No murmur heard  Pulmonary:      Effort: Pulmonary effort is normal  No respiratory distress  Breath sounds: Normal breath sounds  Abdominal:      General: Bowel sounds are normal       Palpations: Abdomen is soft  Tenderness: There is no abdominal tenderness  Musculoskeletal:         General: No swelling  Cervical back: Neck supple  Right lower leg: No edema  Left lower leg: No edema  Skin:     General: Skin is warm and dry  Neurological:      General: No focal deficit present  Mental Status: She is alert and oriented to person, place, and time     Psychiatric:         Mood and Affect: Mood normal          Behavior: Behavior normal           Brian Amaya DO  [facility]

## 2023-03-10 NOTE — ASSESSMENT & PLAN NOTE
· Iron panel ordered for further monitoring as well as Vitamin B12 and Vitamin D  · Patient is counseled that Vitamin D testing may or may not be covered by insurance

## 2023-03-10 NOTE — ASSESSMENT & PLAN NOTE
· Patient is referred for colonoscopy as she is due for repeat     · Mammogram  · Referral to ophthalmology in the area to establish care following move  · Screening Dexa scan ordered  · Hep C and HIV ordered for screening  · Screening lipid panel

## 2023-03-16 ENCOUNTER — HOSPITAL ENCOUNTER (OUTPATIENT)
Dept: RADIOLOGY | Age: 63
Discharge: HOME | End: 2023-03-16

## 2023-03-16 VITALS — HEIGHT: 67 IN | BODY MASS INDEX: 23.23 KG/M2 | WEIGHT: 148 LBS

## 2023-03-16 DIAGNOSIS — Z12.31 ENCOUNTER FOR SCREENING MAMMOGRAM FOR MALIGNANT NEOPLASM OF BREAST: ICD-10-CM

## 2023-05-01 DIAGNOSIS — Z12.11 COLON CANCER SCREENING: Primary | ICD-10-CM

## 2023-05-01 RX ORDER — BISACODYL 5 MG/1
10 TABLET, DELAYED RELEASE ORAL ONCE
Qty: 2 TABLET | Refills: 0 | Status: SHIPPED | OUTPATIENT
Start: 2023-05-01 | End: 2023-06-19 | Stop reason: ALTCHOICE

## 2023-05-05 ENCOUNTER — HOSPITAL ENCOUNTER (OUTPATIENT)
Dept: RADIOLOGY | Age: 63
Discharge: HOME | End: 2023-05-05

## 2023-05-05 VITALS — HEIGHT: 67 IN | WEIGHT: 151.8 LBS | BODY MASS INDEX: 23.83 KG/M2

## 2023-05-05 DIAGNOSIS — Z13.820 OSTEOPOROSIS SCREENING: ICD-10-CM

## 2023-05-11 ENCOUNTER — ANESTHESIA EVENT (OUTPATIENT)
Dept: GASTROENTEROLOGY | Facility: HOSPITAL | Age: 63
End: 2023-05-11

## 2023-05-11 ENCOUNTER — HOSPITAL ENCOUNTER (OUTPATIENT)
Dept: GASTROENTEROLOGY | Facility: HOSPITAL | Age: 63
Setting detail: OUTPATIENT SURGERY
Discharge: HOME | End: 2023-05-11
Attending: INTERNAL MEDICINE

## 2023-05-11 ENCOUNTER — ANESTHESIA (OUTPATIENT)
Dept: GASTROENTEROLOGY | Facility: HOSPITAL | Age: 63
End: 2023-05-11

## 2023-05-11 VITALS
OXYGEN SATURATION: 98 % | RESPIRATION RATE: 16 BRPM | BODY MASS INDEX: 23.07 KG/M2 | WEIGHT: 147 LBS | SYSTOLIC BLOOD PRESSURE: 110 MMHG | TEMPERATURE: 97.2 F | HEART RATE: 51 BPM | HEIGHT: 67 IN | DIASTOLIC BLOOD PRESSURE: 63 MMHG

## 2023-05-11 DIAGNOSIS — Z12.11 SCREENING FOR COLON CANCER: ICD-10-CM

## 2023-05-11 RX ORDER — SODIUM CHLORIDE, SODIUM LACTATE, POTASSIUM CHLORIDE, CALCIUM CHLORIDE 600; 310; 30; 20 MG/100ML; MG/100ML; MG/100ML; MG/100ML
INJECTION, SOLUTION INTRAVENOUS CONTINUOUS PRN
Status: DISCONTINUED | OUTPATIENT
Start: 2023-05-11 | End: 2023-05-11

## 2023-05-11 RX ORDER — PROPOFOL 10 MG/ML
INJECTION, EMULSION INTRAVENOUS AS NEEDED
Status: DISCONTINUED | OUTPATIENT
Start: 2023-05-11 | End: 2023-05-11

## 2023-05-11 RX ADMIN — PROPOFOL 50 MG: 10 INJECTION, EMULSION INTRAVENOUS at 11:22

## 2023-05-11 RX ADMIN — Medication 40 MG: at 11:30

## 2023-05-11 RX ADMIN — PROPOFOL 50 MG: 10 INJECTION, EMULSION INTRAVENOUS at 11:27

## 2023-05-11 RX ADMIN — PROPOFOL 50 MG: 10 INJECTION, EMULSION INTRAVENOUS at 11:29

## 2023-05-11 RX ADMIN — SODIUM CHLORIDE, SODIUM LACTATE, POTASSIUM CHLORIDE, AND CALCIUM CHLORIDE: .6; .31; .03; .02 INJECTION, SOLUTION INTRAVENOUS at 11:16

## 2023-05-11 RX ADMIN — PROPOFOL 20 MG: 10 INJECTION, EMULSION INTRAVENOUS at 11:37

## 2023-05-11 RX ADMIN — PROPOFOL 150 MG: 10 INJECTION, EMULSION INTRAVENOUS at 11:20

## 2023-05-11 RX ADMIN — PROPOFOL 30 MG: 10 INJECTION, EMULSION INTRAVENOUS at 11:32

## 2023-05-11 RX ADMIN — PROPOFOL 30 MG: 10 INJECTION, EMULSION INTRAVENOUS at 11:43

## 2023-05-11 RX ADMIN — PROPOFOL 20 MG: 10 INJECTION, EMULSION INTRAVENOUS at 11:34

## 2023-05-11 RX ADMIN — PROPOFOL 50 MG: 10 INJECTION, EMULSION INTRAVENOUS at 11:24

## 2023-05-11 NOTE — ANESTHESIA POSTPROCEDURE EVALUATION
Post-Op Assessment Note    CV Status:  Stable  Pain Score: 0    Pain management: adequate     Mental Status:  Alert and awake   Hydration Status:  Euvolemic   PONV Controlled:  Controlled   Airway Patency:  Patent      Post Op Vitals Reviewed: Yes      Staff: Anesthesiologist, CRNA         There were no known notable events for this encounter      BP   107/64   Temp   97 2   Pulse   66   Resp   12   SpO2   98% RA

## 2023-05-11 NOTE — ANESTHESIA PREPROCEDURE EVALUATION
Procedure:  COLONOSCOPY     - denies any chest pain, palpitations, shortness of breath, syncope, lightheadedness, seizures   - denies any recent infectious symptoms such as fevers, chills, cough   - denies taking any anticoagulation medications or any issues with bleeding, bruising, clotting    Relevant Problems   ANESTHESIA (within normal limits)      CARDIO (within normal limits)      ENDO   (+) Other specified hypothyroidism      GI/HEPATIC (within normal limits)      /RENAL   (+) CKD (chronic kidney disease) stage 2, GFR 60-89 ml/min   (+) Chronic membranous glomerulonephritis      GYN (within normal limits)      HEMATOLOGY (within normal limits)      MUSCULOSKELETAL (within normal limits)      NEURO/PSYCH (within normal limits)      PULMONARY (within normal limits)      Other   (+) Gilbert's syndrome      Lab Results   Component Value Date    WBC 5 14 01/24/2023    HGB 13 0 01/24/2023    HCT 39 2 01/24/2023    MCV 87 01/24/2023     01/24/2023     Lab Results   Component Value Date    SODIUM 137 01/24/2023    K 4 2 01/24/2023     01/24/2023    CO2 27 01/24/2023    AGAP 6 01/24/2023    BUN 9 01/24/2023    CREATININE 0 73 01/24/2023    GLUC 109 12/11/2021    GLUF 89 01/24/2023    CALCIUM 9 4 01/24/2023    AST 15 01/24/2023    ALT 13 01/24/2023    ALKPHOS 52 01/24/2023    TP 6 5 01/24/2023    TBILI 1 46 (H) 01/24/2023    EGFR 87 01/24/2023     No results found for: PTT  No results found for: INR, PROTIME    Physical Exam    Airway    Mallampati score: II  TM Distance: >3 FB  Neck ROM: full     Dental   No notable dental hx     Cardiovascular  Rhythm: regular, Rate: normal, Cardiovascular exam normal    Pulmonary  Pulmonary exam normal Breath sounds clear to auscultation,     Other Findings        Anesthesia Plan  ASA Score- 2     Anesthesia Type- IV sedation with anesthesia with ASA Monitors           Additional Monitors:   Airway Plan:           Plan Factors-Exercise tolerance (METS): >4 METS     Chart reviewed  EKG reviewed  Imaging results reviewed  Existing labs reviewed  Patient summary reviewed  Patient is not a current smoker  Patient did not smoke on day of surgery  Obstructive sleep apnea risk education given perioperatively  Induction- intravenous  Postoperative Plan-     Informed Consent- Anesthetic plan and risks discussed with patient  I personally reviewed this patient with the CRNA  Discussed and agreed on the Anesthesia Plan with the CRNA  Andra Perkins

## 2023-05-11 NOTE — H&P
"History and Physical -  [facility]  Oriana Lilly 58 y o  female MRN: [id number]    HPI: Oriana Lilly is a 58y o  year old female who presents for colon cancer screening  Review of Systems    Historical Information   History reviewed  No pertinent past medical history  Past Surgical History:   Procedure Laterality Date   • BREAST BIOPSY Left 2014    stereo bx benign   • HYSTERECTOMY      2018     Social History   Social History     Substance and Sexual Activity   Alcohol Use Yes    Comment: occassional     Social History     Substance and Sexual Activity   Drug Use Never     Social History     Tobacco Use   Smoking Status Never   Smokeless Tobacco Never     Family History   Problem Relation Age of Onset   • Mental illness Sister    • Heart disease Brother    • Hypertension Brother        Meds/Allergies     (Not in a hospital admission)      Allergies   Allergen Reactions   • Sulfa Antibiotics Rash     Whole body       Objective     /56   Pulse 65   Temp (!) 97 °F (36 1 °C) (Temporal)   Resp 16   Ht 5' 6 5\" (1 689 m)   Wt 66 7 kg (147 lb)   SpO2 99%   BMI 23 37 kg/m²       PHYSICAL EXAM    Gen: NAD  CV: RRR  CHEST: Clear  ABD: soft, NT/ND  EXT: no edema  Neuro: AAO      ASSESSMENT/PLAN:  This is a 58y o  year old female here for colon cancer screening  PLAN:   Procedure: Colonoscopy        "

## 2023-06-19 ENCOUNTER — OFFICE VISIT (OUTPATIENT)
Dept: FAMILY MEDICINE CLINIC | Facility: CLINIC | Age: 63
End: 2023-06-19
Payer: COMMERCIAL

## 2023-06-19 VITALS
HEART RATE: 57 BPM | WEIGHT: 149.8 LBS | SYSTOLIC BLOOD PRESSURE: 120 MMHG | TEMPERATURE: 97.1 F | HEIGHT: 67 IN | BODY MASS INDEX: 23.51 KG/M2 | RESPIRATION RATE: 18 BRPM | OXYGEN SATURATION: 100 % | DIASTOLIC BLOOD PRESSURE: 70 MMHG

## 2023-06-19 DIAGNOSIS — Z00.00 PREVENTATIVE HEALTH CARE: ICD-10-CM

## 2023-06-19 DIAGNOSIS — M85.80 OSTEOPENIA, UNSPECIFIED LOCATION: Primary | ICD-10-CM

## 2023-06-19 DIAGNOSIS — E78.00 ELEVATED LDL CHOLESTEROL LEVEL: ICD-10-CM

## 2023-06-19 PROCEDURE — 99213 OFFICE O/P EST LOW 20 MIN: CPT | Performed by: FAMILY MEDICINE

## 2023-06-19 RX ORDER — CEPHALEXIN 500 MG/1
1 CAPSULE ORAL EVERY 12 HOURS
COMMUNITY
End: 2023-06-19 | Stop reason: ALTCHOICE

## 2023-06-19 RX ORDER — LEVOTHYROXINE SODIUM 0.07 MG/1
TABLET ORAL
COMMUNITY
Start: 2005-12-01

## 2023-06-19 NOTE — ASSESSMENT & PLAN NOTE
· No medication management is required at this time  · CMP, PTH, and Phosphorus are ordered for further evaluation  · Patient is encouraged to increase weight bearing exercise, supplementation with 1000 IU Vitamin D daily as well as 1200 mg calcium daily - which she may obtain through the diet or supplements  · Plan to repeat DXA scan in 2-3 years

## 2023-06-19 NOTE — ASSESSMENT & PLAN NOTE
Lab Results   Component Value Date    CHOLESTEROL 183 01/24/2023    TRIG 88 01/24/2023    HDL 55 01/24/2023    LDLCALC 110 (H) 01/24/2023     · Patient with elevated LDL in the setting of recent move and indiscretions from whole food plant based diet  · Plan to continue with lifestyle intervention with plan to repeat labs and follow up in 3-4 months  · Patient does not require medication management at this time

## 2023-06-19 NOTE — ASSESSMENT & PLAN NOTE
· Mammogram normal - plan to repeat in 1 year  · Benign polyp noted on colonoscopy - plan to repeat in 5 years

## 2023-06-19 NOTE — PROGRESS NOTES
Family Medicine Follow-Up Office Visit  Constance Ramirez 58 y o  female   MRN: [id number] : 1960  ENCOUNTER: 2023 2:14 PM    Assessment and Plan   Preventative health care  Mammogram normal - plan to repeat in 1 year  Benign polyp noted on colonoscopy - plan to repeat in 5 years    Osteopenia  No medication management is required at this time  CMP, PTH, and Phosphorus are ordered for further evaluation  Patient is encouraged to increase weight bearing exercise, supplementation with 1000 IU Vitamin D daily as well as 1200 mg calcium daily - which she may obtain through the diet or supplements  Plan to repeat DXA scan in 2-3 years    Elevated LDL cholesterol level  Lab Results   Component Value Date    CHOLESTEROL 183 2023    TRIG 88 2023    HDL 55 2023    LDLCALC 110 (H) 2023     Patient with elevated LDL in the setting of recent move and indiscretions from whole food plant based diet  Plan to continue with lifestyle intervention with plan to repeat labs and follow up in 3-4 months  Patient does not require medication management at this time      Chief Complaint     Chief Complaint   Patient presents with   • Follow-up     Cholesterol        History of Present Illness   Constance Ramirez is a 58y o -year-old female with PMHx of HLD, Hypothyroidism and Gilbert's Syndrome who presents today for follow up of chronic conditions and review of preventative care workup  Mammogram - normal findings are reviewed, plan to repeat in 1 year    Colonoscopy - Reviewed findings with one 5 mm polyp removed with small internal hemorrhoids noted  Reviewed plan to repeat colonoscopy in 5 years and continue with high fiber diet  DXA - shows osteopenia  10 year risk of hip fracture 1 5% with the 10 year risk of major osteoporotic fracture being 14%  No need for bisphosphonate or other osteoporosis therapy at this time   Plan to repeat DXA in 2-3 years, advised on weight bearing exercise, "1000 IU Vitamin D daily, 1200 mg calcium daily - can be through the diet or supplements  HLD - ASCVD risk 4 2% with LDL at 110 which is above goal  She has not yet quite made the lifestyle changes that she would like to due to life stressors and so she will wait to repeat Lipid panel  Review of Systems   Review of Systems   Constitutional: Negative for fatigue and fever  HENT: Negative for congestion and sore throat  Respiratory: Negative for cough and shortness of breath  Cardiovascular: Negative for chest pain and palpitations  Gastrointestinal: Negative for abdominal pain, blood in stool, constipation, diarrhea, nausea and vomiting  Genitourinary: Negative for dysuria and hematuria  Musculoskeletal: Negative for arthralgias and myalgias  Skin: Negative for rash  Neurological: Negative for dizziness and headaches  Psychiatric/Behavioral: Negative for dysphoric mood  The patient is not nervous/anxious  Active Problem List     Patient Active Problem List   Diagnosis   • Other specified hypothyroidism   • Gilbert's syndrome   • Elevated LDL cholesterol level   • CKD (chronic kidney disease) stage 2, GFR 60-89 ml/min   • Preventative health care   • Chronic membranous glomerulonephritis   • Vegan diet   • Osteopenia       Past Medical History, Past Surgical History, Family History, and Social History were reviewed and updated today as appropriate  Objective   /70 (BP Location: Right arm, Patient Position: Sitting, Cuff Size: Standard)   Pulse 57   Temp (!) 97 1 °F (36 2 °C) (Tympanic)   Resp 18   Ht 5' 6 5\" (1 689 m)   Wt 67 9 kg (149 lb 12 8 oz)   SpO2 100%   BMI 23 82 kg/m²     Physical Exam  Vitals reviewed  Constitutional:       General: She is not in acute distress  Appearance: Normal appearance  She is not ill-appearing  HENT:      Head: Normocephalic and atraumatic        Right Ear: External ear normal       Left Ear: External ear normal    Eyes:      " "General: No scleral icterus  Conjunctiva/sclera: Conjunctivae normal    Cardiovascular:      Rate and Rhythm: Normal rate  Pulmonary:      Effort: Pulmonary effort is normal  No respiratory distress  Musculoskeletal:         General: Normal range of motion  Cervical back: Normal range of motion  Neurological:      General: No focal deficit present  Mental Status: She is alert and oriented to person, place, and time     Psychiatric:         Mood and Affect: Mood normal          Behavior: Behavior normal            Pertinent Laboratory/Diagnostic Studies:  Lab Results   Component Value Date    BUN 9 01/24/2023    CREATININE 0 73 01/24/2023    CALCIUM 9 4 01/24/2023    K 4 2 01/24/2023    CO2 27 01/24/2023     01/24/2023     Lab Results   Component Value Date    ALT 13 01/24/2023    AST 15 01/24/2023    ALKPHOS 52 01/24/2023       Lab Results   Component Value Date    WBC 5 14 01/24/2023    HGB 13 0 01/24/2023    HCT 39 2 01/24/2023    MCV 87 01/24/2023     01/24/2023       No results found for: \"TSH\"    No results found for: \"CHOL\"  Lab Results   Component Value Date    TRIG 88 01/24/2023     Lab Results   Component Value Date    HDL 55 01/24/2023     Lab Results   Component Value Date    LDLCALC 110 (H) 01/24/2023     Lab Results   Component Value Date    HGBA1C 4 6 01/18/2022       Results for orders placed or performed during the hospital encounter of 05/11/23   Tissue Exam   Result Value Ref Range    Case Report       Surgical Pathology Report                         Case: [id number]                                   Authorizing Provider:  Rc Altaimrano MD       Collected:           05/11/2023 1144              Ordering Location:     [facility]        Received:            05/11/2023 1405 [facility]                                                           Pathologist:           Alexey Macdonald" "                                                 MD                                                                           Specimen:    Polyp, Colorectal, rectal polyp-cold snare                                                 Final Diagnosis       RECTUM, POLYP, POLYPECTOMY:    - Hyperplastic polyp  Additional Information       All reported additional testing was performed with appropriately reactive controls  These tests were developed and their performance characteristics determined by [facility] or appropriate performing facility, though some tests may be performed on tissues which have not been validated for performance characteristics (such as staining performed on alcohol exposed cell blocks and decalcified tissues)  Results should be interpreted with caution and in the context of the patients’ clinical condition  These tests may not be cleared or approved by the [place]  Food and Drug Administration, though the FDA has determined that such clearance or approval is not necessary  These tests are used for clinical purposes and they should not be regarded as investigational or for research  This laboratory has been approved by [place] 88, designated as a high-complexity laboratory and is qualified to perform these tests  Interpretation performed at [facility], [address] [place] [address]  Synoptic Checklist          COLON/RECTUM POLYP FORM - GI - All Specimens          :    Other      Gross Description       A  The specimen is received in formalin, labeled with the patient's name and hospital number, and is designated \" rectal polyp\"  The specimen consists of a tan soft tissue fragment measuring 0 5 cm  Entirely submitted  Screened cassette  Note: The estimated total formalin fixation time based upon information provided by the submitting clinician and the standard processing schedule is under 72 hours      MCrites         Orders Placed This Encounter " Procedures   • Comprehensive metabolic panel   • PTH, intact   • Phosphorus         Current Medications     Current Outpatient Medications   Medication Sig Dispense Refill   • levothyroxine 75 mcg tablet      • Cyanocobalamin 1000 MCG/ML LIQD Take 2,000 mcg by mouth daily (Patient not taking: Reported on 6/19/2023)     • levothyroxine 75 mcg tablet Take 1 tablet (75 mcg total) by mouth daily 90 tablet 3     No current facility-administered medications for this visit  ALLERGIES:  Allergies   Allergen Reactions   • Sulfa Antibiotics Rash     Whole body       Health Maintenance     Health Maintenance   Topic Date Due   • Cervical Cancer Screening  Never done   • Depression Screening  01/23/2024   • Annual Physical  01/23/2024   • Breast Cancer Screening: Mammogram  03/16/2024   • BMI: Adult  06/19/2024   • DXA SCAN  05/05/2028   • Colorectal Cancer Screening  05/09/2028   • DTaP,Tdap,and Td Vaccines (2 - Td or Tdap) 11/28/2028   • HIV Screening  Completed   • Hepatitis C Screening  Completed   • Influenza Vaccine  Completed   • COVID-19 Vaccine  Completed   • Pneumococcal Vaccine: Pediatrics (0 to 5 Years) and At-Risk Patients (6 to 59 Years)  Aged Out   • HIB Vaccine  Aged Out   • IPV Vaccine  Aged Out   • Hepatitis A Vaccine  Aged Out   • Meningococcal ACWY Vaccine  Aged Out   • HPV Vaccine  Aged [employer] History   Administered Date(s) Administered   • COVID-19 MODERNA VACC 0 5 ML IM 03/26/2021, 04/23/2021, 12/12/2021   • COVID-19 Pfizer Vac BIVALENT Shaliesh-sucrose 12 Yr+ IM (BOOSTER ONLY) 12/12/2022   • INFLUENZA 11/02/2021, 10/31/2022   • Tdap 11/28/2018         Ally Rey DO   [address]  6/19/2023  2:14 PM    Parts of this note were dictated using [email or web address] dictation software and may have sounds-like errors due to variation in pronunciation

## 2024-02-14 DIAGNOSIS — E03.8 OTHER SPECIFIED HYPOTHYROIDISM: ICD-10-CM

## 2024-02-14 RX ORDER — LEVOTHYROXINE SODIUM 0.07 MG/1
75 TABLET ORAL DAILY
Qty: 30 TABLET | Refills: 0 | Status: SHIPPED | OUTPATIENT
Start: 2024-02-14 | End: 2024-05-14

## 2024-03-15 DIAGNOSIS — E03.8 OTHER SPECIFIED HYPOTHYROIDISM: ICD-10-CM

## 2024-03-18 RX ORDER — LEVOTHYROXINE SODIUM 0.07 MG/1
75 TABLET ORAL DAILY
Qty: 30 TABLET | Refills: 0 | Status: SHIPPED | OUTPATIENT
Start: 2024-03-18

## 2024-04-17 DIAGNOSIS — E03.8 OTHER SPECIFIED HYPOTHYROIDISM: ICD-10-CM

## 2024-04-18 RX ORDER — LEVOTHYROXINE SODIUM 0.07 MG/1
75 TABLET ORAL DAILY
Qty: 30 TABLET | Refills: 0 | Status: SHIPPED | OUTPATIENT
Start: 2024-04-18

## 2024-05-13 ENCOUNTER — OFFICE VISIT (OUTPATIENT)
Dept: FAMILY MEDICINE CLINIC | Facility: CLINIC | Age: 64
End: 2024-05-13
Payer: COMMERCIAL

## 2024-05-13 VITALS
HEART RATE: 58 BPM | TEMPERATURE: 97.2 F | OXYGEN SATURATION: 99 % | DIASTOLIC BLOOD PRESSURE: 62 MMHG | WEIGHT: 152 LBS | SYSTOLIC BLOOD PRESSURE: 120 MMHG | BODY MASS INDEX: 23.86 KG/M2 | HEIGHT: 67 IN | RESPIRATION RATE: 18 BRPM

## 2024-05-13 DIAGNOSIS — E03.8 OTHER SPECIFIED HYPOTHYROIDISM: ICD-10-CM

## 2024-05-13 DIAGNOSIS — E78.00 ELEVATED LDL CHOLESTEROL LEVEL: ICD-10-CM

## 2024-05-13 DIAGNOSIS — N18.2 CKD (CHRONIC KIDNEY DISEASE) STAGE 2, GFR 60-89 ML/MIN: ICD-10-CM

## 2024-05-13 DIAGNOSIS — M85.80 OSTEOPENIA, UNSPECIFIED LOCATION: ICD-10-CM

## 2024-05-13 DIAGNOSIS — Z00.00 ANNUAL PHYSICAL EXAM: Primary | ICD-10-CM

## 2024-05-13 DIAGNOSIS — Z12.31 ENCOUNTER FOR SCREENING MAMMOGRAM FOR BREAST CANCER: ICD-10-CM

## 2024-05-13 DIAGNOSIS — E03.9 HYPOTHYROIDISM, UNSPECIFIED TYPE: ICD-10-CM

## 2024-05-13 PROCEDURE — 99213 OFFICE O/P EST LOW 20 MIN: CPT

## 2024-05-13 NOTE — PROGRESS NOTES
ADULT ANNUAL PHYSICAL  [facility]    NAME: Isela Horton  AGE: 64 y.o. SEX: female  : 1960     DATE: 2024     Assessment and Plan:     Problem List Items Addressed This Visit     Hypothyroidism     Patient currently taking levothyroxine 75 mcg  Lab Results   Component Value Date    [id number] 1.630 2023    FREET4 1.19 2023     Plan:  Continue levothyroxine 75 mcg daily  Repeat TSH         Relevant Orders    TSH, 3rd generation with Free T4 reflex    Elevated LDL cholesterol level     Elevated LDL noted on last lipid panel  - Noted in setting of lifestyle modifications and deviation from whole-food, plant-based diet  Lab Results   Component Value Date    CHOLESTEROL 183 2023    TRIG 88 2023    HDL 55 2023    LDLCALC 110 (H) 2023     Plan:  -Encourage continued plant-based/whole-food  -Repeat lipid panel  -No need for medication management at this time           Relevant Orders    Lipid Panel with Direct LDL reflex    CKD (chronic kidney disease) stage 2, GFR 60-89 ml/min     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  - Noted improvement based on  labs  Lab Results   Component Value Date    EGFR 87 2023    EGFR 67 2021    CREATININE 0.73 2023    CREATININE 0.92 2021       Plan:  Repeat CMP and CBC          Relevant Orders    CBC and differential    Osteopenia     Osteopenia with left femoral neck T-score of -1.5 on 23  - no medication management at this time      Plan:  - CMP, PTH, and Phosphorus  - Encourage weight bearing exercises  - Recommended calcium supplementations with 1000 IU Vitamin D daily and 1200 mg Calcium daily  - Plan to repeat DXA in 1-2 years         Relevant Orders    PTH, intact    Phosphorus    Comprehensive metabolic panel    CBC and differential    Vitamin D 25 hydroxy   Other Visit Diagnoses     Annual physical exam    -   Primary    Encounter for screening mammogram for breast cancer        Relevant Orders    Mammo screening bilateral w 3d & cad          Immunizations and preventive care screenings were discussed with patient today. Appropriate education was printed on patient's after visit summary.    Counseling:  Alcohol/drug use: discussed moderation in alcohol intake, the recommendations for healthy alcohol use, and avoidance of illicit drug use.  Dental Health: discussed importance of regular tooth brushing, flossing, and dental visits.  Sexual health: discussed sexually transmitted diseases, partner selection, use of condoms, avoidance of unintended pregnancy, and contraceptive alternatives.  Exercise: the importance of regular exercise/physical activity was discussed. Recommend exercise 3-5 times per week for at least 30 minutes.        Nutrition Assessment and Intervention:     Reviewed and updated Nutrition Prescription      Other interventions: Continuing to strive for whole-food, plant-based diet.     Physical Activity Assessment and Intervention:    Physical activity online resources/apps provided to patient      Other interventions: Discussed online resources for weight-bearing exercises, especially those that utilize body weight.  Encouraged continued walking. Current activity includes 2 miles/day. Goal to incorporate light weights into routine by next visit.     Emotional and Mental Well-being, Sleep, Connectedness Assessment and Intervention:    Depression and anxiety screening performed and reviewed      Other interventions: PHQ-2 of one. Discussed social stressors and support system available for patient.       Return in about 1 month (around 6/13/2024) for Medicare AWV.     Chief Complaint:     Chief Complaint   Patient presents with   • Physical Exam      History of Present Illness:     Adult Annual Physical   Patient here for a comprehensive physical exam. The patient reports no problems.      Diet and Physical  Activity  Diet/Nutrition: consuming 3-5 servings of fruits/vegetables daily. Vegan diet. Whole-food plant based a majority of days. She has been taking vitamin D and multivitamin supplements  Exercise: walking and 5-7 times a week on average. Walks 2 miles most days     Depression Screening  PHQ-2/9 Depression Screening    Little interest or pleasure in doing things: 0 - not at all  Feeling down, depressed, or hopeless: 1 - several days  PHQ-2 Score: 1  PHQ-2 Interpretation: Negative depression screen       General Health  Sleep: sleeps well and gets 7-8 hours of sleep on average.   Hearing: normal - bilateral.  Vision: goes for regular eye exams, most recent eye exam <1 year ago, and wears glasses.   Dental: regular dental visits, does not brush teeth regularly, and flosses teeth occasionally.       /GYN Health  Follows with gynecology? no   Patient is: postmenopausal  Last menstrual period: 2010  Contraceptive method: total hysterectomy 2018    Advanced Care Planning  Do you have an advanced directive? yes  Do you have a durable medical power of ? yes  ACP document given to the patient? no     Review of Systems:     Review of Systems   Constitutional:  Negative for activity change, appetite change and fever.   HENT:  Negative for congestion.    Respiratory:  Negative for cough, chest tightness, shortness of breath and wheezing.    Cardiovascular:  Negative for chest pain, palpitations and leg swelling.   Gastrointestinal:  Negative for abdominal pain, constipation, diarrhea and vomiting.   Genitourinary:  Negative for difficulty urinating and dysuria.   Musculoskeletal:  Negative for back pain.   Neurological:  Negative for syncope.   All other systems reviewed and are negative.     Past Medical History:     No past medical history on file.   Past Surgical History:     Past Surgical History:   Procedure Laterality Date   • BREAST BIOPSY Left 2014    stereo bx benign   • HYSTERECTOMY      2018      Social  "History:     Social History     Socioeconomic History   • Marital status: /Civil Union     Spouse name: None   • Number of children: None   • Years of education: None   • Highest education level: None   Occupational History   • None   Tobacco Use   • Smoking status: Never     Passive exposure: Never   • Smokeless tobacco: Never   Vaping Use   • Vaping status: Never Used   Substance and Sexual Activity   • Alcohol use: Yes     Comment: occassional   • Drug use: Never   • Sexual activity: None   Other Topics Concern   • None   Social History Narrative   • None     Social Determinants of Health     Financial Resource Strain: Not on file   Food Insecurity: Not on file   Transportation Needs: Not on file   Physical Activity: Not on file   Stress: Not on file   Social Connections: Not on file   Intimate Partner Violence: Not on file   Housing Stability: Not on file      Family History:     Family History   Problem Relation Age of Onset   • Mental illness Sister    • Heart disease Brother    • Hypertension Brother       Current Medications:     Current Outpatient Medications   Medication Sig Dispense Refill   • levothyroxine 75 mcg tablet TAKE 1 TABLET BY MOUTH EVERY DAY 30 tablet 0   • Cyanocobalamin 1000 MCG/ML LIQD Take 2,000 mcg by mouth daily (Patient not taking: Reported on 6/19/2023)       No current facility-administered medications for this visit.      Allergies:     Allergies   Allergen Reactions   • Sulfa Antibiotics Rash     Whole body      Physical Exam:     /62 (BP Location: Right arm, Patient Position: Sitting, Cuff Size: Standard)   Pulse 58   Temp (!) 97.2 °F (36.2 °C) (Tympanic)   Resp 18   Ht 5' 7\" (1.702 m)   Wt 68.9 kg (152 lb)   SpO2 99%   BMI 23.81 kg/m²     Physical Exam  Vitals reviewed.   Constitutional:       General: She is not in acute distress.     Appearance: She is not ill-appearing or diaphoretic.   HENT:      Head: Normocephalic and atraumatic.      Right Ear: Tympanic " membrane, ear canal and external ear normal. There is no impacted cerumen.      Left Ear: Tympanic membrane, ear canal and external ear normal. There is no impacted cerumen.      Nose: Nose normal. No congestion or rhinorrhea.      Mouth/Throat:      Mouth: Mucous membranes are moist.      Pharynx: Oropharynx is clear. No oropharyngeal exudate or posterior oropharyngeal erythema.   Eyes:      General:         Right eye: No discharge.         Left eye: No discharge.      Conjunctiva/sclera: Conjunctivae normal.   Cardiovascular:      Rate and Rhythm: Normal rate and regular rhythm.      Pulses: Normal pulses.      Heart sounds: Normal heart sounds. No murmur heard.  Pulmonary:      Effort: Pulmonary effort is normal. No respiratory distress.      Breath sounds: Normal breath sounds. No wheezing or rales.   Abdominal:      General: Bowel sounds are normal. There is no distension.      Palpations: Abdomen is soft.      Tenderness: There is no abdominal tenderness. There is no guarding or rebound.      Hernia: No hernia is present.   Musculoskeletal:      Cervical back: Normal range of motion.      Right lower leg: No edema.      Left lower leg: No edema.   Lymphadenopathy:      Cervical: No cervical adenopathy.   Skin:     General: Skin is warm and dry.      Capillary Refill: Capillary refill takes less than 2 seconds.      Findings: No rash.   Neurological:      General: No focal deficit present.      Mental Status: She is alert and oriented to person, place, and time.      Sensory: No sensory deficit.      Motor: No weakness.   Psychiatric:         Mood and Affect: Mood normal.         Behavior: Behavior normal.          Peyton Persaud,   [facility]

## 2024-05-13 NOTE — ASSESSMENT & PLAN NOTE
Osteopenia with left femoral neck T-score of -1.5 on 5/5/23  - no medication management at this time      Plan:  - CMP, PTH, and Phosphorus  - Encourage weight bearing exercises  - Recommended calcium supplementations with 1000 IU Vitamin D daily and 1200 mg Calcium daily  - Plan to repeat DXA in 1-2 years

## 2024-05-13 NOTE — ASSESSMENT & PLAN NOTE
Patient currently taking levothyroxine 75 mcg  Lab Results   Component Value Date    [id number] 1.630 01/24/2023    FREET4 1.19 01/24/2023     Plan:  Continue levothyroxine 75 mcg daily  Repeat TSH

## 2024-05-13 NOTE — ASSESSMENT & PLAN NOTE
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

## 2024-05-13 NOTE — ASSESSMENT & PLAN NOTE
Elevated LDL noted on last lipid panel  - Noted in setting of lifestyle modifications and deviation from whole-food, plant-based diet  Lab Results   Component Value Date    CHOLESTEROL 183 01/24/2023    TRIG 88 01/24/2023    HDL 55 01/24/2023    LDLCALC 110 (H) 01/24/2023     Plan:  -Encourage continued plant-based/whole-food  -Repeat lipid panel  -No need for medication management at this time

## 2024-05-14 DIAGNOSIS — E03.8 OTHER SPECIFIED HYPOTHYROIDISM: ICD-10-CM

## 2024-05-15 RX ORDER — LEVOTHYROXINE SODIUM 0.07 MG/1
75 TABLET ORAL DAILY
Qty: 90 TABLET | Refills: 1 | Status: SHIPPED | OUTPATIENT
Start: 2024-05-15

## 2024-05-24 ENCOUNTER — HOSPITAL ENCOUNTER (OUTPATIENT)
Dept: RADIOLOGY | Facility: IMAGING CENTER | Age: 64
End: 2024-05-24
Payer: COMMERCIAL

## 2024-05-24 VITALS — WEIGHT: 152 LBS | BODY MASS INDEX: 23.86 KG/M2 | HEIGHT: 67 IN

## 2024-05-24 DIAGNOSIS — Z12.31 ENCOUNTER FOR SCREENING MAMMOGRAM FOR BREAST CANCER: ICD-10-CM

## 2024-05-24 PROCEDURE — 77063 BREAST TOMOSYNTHESIS BI: CPT

## 2024-05-24 PROCEDURE — 77067 SCR MAMMO BI INCL CAD: CPT

## 2024-05-30 ENCOUNTER — APPOINTMENT (OUTPATIENT)
Dept: LAB | Facility: CLINIC | Age: 64
End: 2024-05-30
Payer: COMMERCIAL

## 2024-05-30 DIAGNOSIS — E03.9 HYPOTHYROIDISM, UNSPECIFIED TYPE: ICD-10-CM

## 2024-05-30 DIAGNOSIS — E78.00 ELEVATED LDL CHOLESTEROL LEVEL: ICD-10-CM

## 2024-05-30 DIAGNOSIS — M85.80 OSTEOPENIA, UNSPECIFIED LOCATION: ICD-10-CM

## 2024-05-30 DIAGNOSIS — N18.2 CKD (CHRONIC KIDNEY DISEASE) STAGE 2, GFR 60-89 ML/MIN: ICD-10-CM

## 2024-05-30 LAB
25(OH)D3 SERPL-MCNC: 38.4 NG/ML (ref 30–100)
ALBUMIN SERPL BCP-MCNC: 4.5 G/DL (ref 3.5–5)
ALP SERPL-CCNC: 50 U/L (ref 34–104)
ALT SERPL W P-5'-P-CCNC: 14 U/L (ref 7–52)
ANION GAP SERPL CALCULATED.3IONS-SCNC: 6 MMOL/L (ref 4–13)
AST SERPL W P-5'-P-CCNC: 13 U/L (ref 13–39)
BASOPHILS # BLD AUTO: 0.06 THOUSANDS/ÂΜL (ref 0–0.1)
BASOPHILS NFR BLD AUTO: 1 % (ref 0–1)
BILIRUB SERPL-MCNC: 1.54 MG/DL (ref 0.2–1)
BUN SERPL-MCNC: 9 MG/DL (ref 5–25)
CALCIUM SERPL-MCNC: 9.7 MG/DL (ref 8.4–10.2)
CHLORIDE SERPL-SCNC: 106 MMOL/L (ref 96–108)
CHOLEST SERPL-MCNC: 213 MG/DL
CO2 SERPL-SCNC: 28 MMOL/L (ref 21–32)
CREAT SERPL-MCNC: 0.74 MG/DL (ref 0.6–1.3)
EOSINOPHIL # BLD AUTO: 0.09 THOUSAND/ÂΜL (ref 0–0.61)
EOSINOPHIL NFR BLD AUTO: 2 % (ref 0–6)
ERYTHROCYTE [DISTWIDTH] IN BLOOD BY AUTOMATED COUNT: 12.9 % (ref 11.6–15.1)
GFR SERPL CREATININE-BSD FRML MDRD: 85 ML/MIN/1.73SQ M
GLUCOSE P FAST SERPL-MCNC: 89 MG/DL (ref 65–99)
HCT VFR BLD AUTO: 42 % (ref 34.8–46.1)
HDLC SERPL-MCNC: 66 MG/DL
HGB BLD-MCNC: 13.7 G/DL (ref 11.5–15.4)
IMM GRANULOCYTES # BLD AUTO: 0.02 THOUSAND/UL (ref 0–0.2)
IMM GRANULOCYTES NFR BLD AUTO: 0 % (ref 0–2)
LDLC SERPL CALC-MCNC: 127 MG/DL (ref 0–100)
LYMPHOCYTES # BLD AUTO: 2.32 THOUSANDS/ÂΜL (ref 0.6–4.47)
LYMPHOCYTES NFR BLD AUTO: 38 % (ref 14–44)
MCH RBC QN AUTO: 29.1 PG (ref 26.8–34.3)
MCHC RBC AUTO-ENTMCNC: 32.6 G/DL (ref 31.4–37.4)
MCV RBC AUTO: 89 FL (ref 82–98)
MONOCYTES # BLD AUTO: 0.46 THOUSAND/ÂΜL (ref 0.17–1.22)
MONOCYTES NFR BLD AUTO: 8 % (ref 4–12)
NEUTROPHILS # BLD AUTO: 3.13 THOUSANDS/ÂΜL (ref 1.85–7.62)
NEUTS SEG NFR BLD AUTO: 51 % (ref 43–75)
NRBC BLD AUTO-RTO: 0 /100 WBCS
PHOSPHATE SERPL-MCNC: 3.5 MG/DL (ref 2.3–4.1)
PLATELET # BLD AUTO: 267 THOUSANDS/UL (ref 149–390)
PMV BLD AUTO: 10.1 FL (ref 8.9–12.7)
POTASSIUM SERPL-SCNC: 4.5 MMOL/L (ref 3.5–5.3)
PROT SERPL-MCNC: 7 G/DL (ref 6.4–8.4)
PTH-INTACT SERPL-MCNC: 45.9 PG/ML (ref 12–88)
RBC # BLD AUTO: 4.71 MILLION/UL (ref 3.81–5.12)
SODIUM SERPL-SCNC: 140 MMOL/L (ref 135–147)
TRIGL SERPL-MCNC: 98 MG/DL
TSH SERPL DL<=0.05 MIU/L-ACNC: 1.72 UIU/ML (ref 0.45–4.5)
WBC # BLD AUTO: 6.08 THOUSAND/UL (ref 4.31–10.16)

## 2024-05-30 PROCEDURE — 80053 COMPREHEN METABOLIC PANEL: CPT

## 2024-05-30 PROCEDURE — 83970 ASSAY OF PARATHORMONE: CPT

## 2024-05-30 PROCEDURE — 36415 COLL VENOUS BLD VENIPUNCTURE: CPT

## 2024-05-30 PROCEDURE — 84100 ASSAY OF PHOSPHORUS: CPT

## 2024-05-30 PROCEDURE — 84443 ASSAY THYROID STIM HORMONE: CPT

## 2024-05-30 PROCEDURE — 82306 VITAMIN D 25 HYDROXY: CPT

## 2024-05-30 PROCEDURE — 80061 LIPID PANEL: CPT

## 2024-05-30 PROCEDURE — 85025 COMPLETE CBC W/AUTO DIFF WBC: CPT

## 2024-06-19 ENCOUNTER — OFFICE VISIT (OUTPATIENT)
Dept: FAMILY MEDICINE CLINIC | Facility: CLINIC | Age: 64
End: 2024-06-19
Payer: MEDICARE

## 2024-06-19 VITALS
DIASTOLIC BLOOD PRESSURE: 60 MMHG | WEIGHT: 153 LBS | OXYGEN SATURATION: 98 % | BODY MASS INDEX: 24.01 KG/M2 | RESPIRATION RATE: 18 BRPM | TEMPERATURE: 97.1 F | SYSTOLIC BLOOD PRESSURE: 120 MMHG | HEIGHT: 67 IN | HEART RATE: 60 BPM

## 2024-06-19 DIAGNOSIS — E78.00 ELEVATED LDL CHOLESTEROL LEVEL: ICD-10-CM

## 2024-06-19 DIAGNOSIS — Z00.00 MEDICARE ANNUAL WELLNESS VISIT, INITIAL: Primary | ICD-10-CM

## 2024-06-19 DIAGNOSIS — Z00.00 PREVENTATIVE HEALTH CARE: ICD-10-CM

## 2024-06-19 PROCEDURE — G0402 INITIAL PREVENTIVE EXAM: HCPCS

## 2024-06-19 NOTE — ASSESSMENT & PLAN NOTE
Reviewed health maintenance and GOC planning as part of medicare annual wellness visit.     DEXA scan reviewed - T-score of -1.5 on 5/5/23  - encourage weight bearing exercises  - recommended calcium supplementations with 1000 IU Vitamin D daily and 1200 mg Calcium daily  - repeat DEXA in 1-2 years    Mammogram - 5/2024 -normal  - plan repeat screening in 1 year    Colonoscopy - 2023  -Repeat colonoscopy in 5 years     Paperwork for advanced care planning discussed and printed for patient  - she expresses she will complete and return to the office

## 2024-06-19 NOTE — PROGRESS NOTES
Ambulatory Visit  Name: Isela Horton      : 1960      MRN: [id number]  Encounter Provider: Peyton Persaud DO  Encounter Date: 2024   Encounter department: [facility]    Assessment & Plan   1. Medicare annual wellness visit, initial  2. Preventative health care  Assessment & Plan:  Reviewed health maintenance and GOC planning as part of medicare annual wellness visit.     DEXA scan reviewed - T-score of -1.5 on 23  - encourage weight bearing exercises  - recommended calcium supplementations with 1000 IU Vitamin D daily and 1200 mg Calcium daily  - repeat DEXA in 1-2 years    Mammogram - 2024 -normal  - plan repeat screening in 1 year    Colonoscopy -   -Repeat colonoscopy in 5 years       3. Elevated LDL cholesterol level  Assessment & Plan:  LDL elevated at 127  Total cholesterol 213  ASCVD risk 4.7%     Plan:  Discussed lifestyle modifications including diet and exercise  Repeat lipid panel in 6 months          Preventive health issues were discussed with patient, and age appropriate screening tests were ordered as noted in patient's After Visit Summary. Personalized health advice and appropriate referrals for health education or preventive services given if needed, as noted in patient's After Visit Summary.    History of Present Illness     HPI   74-year-old female presents to the office for her initial annual Medicare wellness visit.  Her recent labs also were discussed.  She expresses that she is planning to become more strict with her plant-based diet given her recent increase in her cholesterol levels.  Previously she cut out most oils from her diet and felt as though this was beneficial-she plans to make this change in the future.  Otherwise she maintains a well-balanced plant-based diet.  She takes her vitamin D supplementation reports that her current vitamin does not contain calcium.      No other concerns at this time    Patient Care  Team:  Peyton Persaud DO as PCP - General (Family Medicine)    Review of Systems   Constitutional:  Negative for activity change, appetite change, fatigue and fever.   Respiratory:  Negative for shortness of breath.    Cardiovascular:  Negative for chest pain.   Gastrointestinal:  Negative for abdominal pain, constipation, diarrhea and vomiting.   Genitourinary:  Negative for difficulty urinating.   Skin:  Negative for rash.   Neurological:  Negative for syncope.     Medical History Reviewed by provider this encounter:       Annual Wellness Visit Questionnaire   Isela is here for her Welcome to Medicare visit.     Health Risk Assessment:   Patient rates overall health as very good. Patient feels that their physical health rating is same. Patient is very satisfied with their life. Eyesight was rated as same. Hearing was rated as same. Patient feels that their emotional and mental health rating is same. Patients states they are sometimes angry. Patient states they are sometimes unusually tired/fatigued. Pain experienced in the last 7 days has been none. Patient states that she has experienced no weight loss or gain in last 6 months.     Fall Risk Screening:   In the past year, patient has experienced: no history of falling in past year      Urinary Incontinence Screening:   Patient has not leaked urine accidently in the last six months.     Home Safety:  Patient does not have trouble with stairs inside or outside of their home. Patient has working smoke alarms and has working carbon monoxide detector. Home safety hazards include: none.     Nutrition:   Current diet is Other (please comment). vegan    Medications:   Patient is not currently taking any over-the-counter supplements. Patient is able to manage medications.     Activities of Daily Living (ADLs)/Instrumental Activities of Daily Living (IADLs):   Walk and transfer into and out of bed and chair?: Yes  Dress and groom yourself?: Yes    Bathe or shower  yourself?: Yes    Feed yourself? Yes  Do your laundry/housekeeping?: Yes  Manage your money, pay your bills and track your expenses?: Yes  Make your own meals?: Yes    Do your own shopping?: Yes    Previous Hospitalizations:   Any hospitalizations or ED visits within the last 12 months?: No      Advance Care Planning:   Living will: Yes    Durable POA for healthcare: Yes    Advanced directive: Yes      Comments: Patient     PREVENTIVE SCREENINGS      Cardiovascular Screening:    General: Screening Current      Diabetes Screening:     General: Screening Current      Colorectal Cancer Screening:     General: Screening Current      Breast Cancer Screening:     General: Screening Current      Cervical Cancer Screening:    General: Screening Not Indicated      Osteoporosis Screening:    General: Screening Current      Lung Cancer Screening:     General: Screening Not Indicated      Hepatitis C Screening:    General: Screening Current    Screening, Brief Intervention, and Referral to Treatment (SBIRT)    Screening      Single Item Drug Screening:  How often have you used an illegal drug (including marijuana) or a prescription medication for non-medical reasons in the past year? never    Single Item Drug Screen Score: 0  Interpretation: Negative screen for possible drug use disorder    Social Determinants of Health     Food Insecurity: No Food Insecurity (6/19/2024)    Hunger Vital Sign     Worried About Running Out of Food in the Last Year: Never true     Ran Out of Food in the Last Year: Never true   Transportation Needs: No Transportation Needs (6/19/2024)    PRAPARE - Transportation     Lack of Transportation (Medical): No     Lack of Transportation (Non-Medical): No   Housing Stability: Unknown (6/19/2024)    Housing Stability Vital Sign     Unable to Pay for Housing in the Last Year: No     Homeless in the Last Year: No   Utilities: Not At Risk (6/19/2024)    [facility] Utilities     Threatened with loss of utilities: No  "    No results found.    Objective     /60 (BP Location: Right arm, Patient Position: Sitting, Cuff Size: Standard)   Pulse 60   Temp (!) 97.1 °F (36.2 °C) (Tympanic)   Resp 18   Ht 5' 7\" (1.702 m)   Wt 69.4 kg (153 lb)   SpO2 98%   BMI 23.96 kg/m²     Physical Exam  Vitals reviewed.   Constitutional:       General: She is not in acute distress.  HENT:      Head: Normocephalic and atraumatic.      Right Ear: External ear normal.      Left Ear: External ear normal.      Nose: Nose normal.      Mouth/Throat:      Mouth: Mucous membranes are moist.      Pharynx: Oropharynx is clear. No oropharyngeal exudate or posterior oropharyngeal erythema.   Eyes:      General:         Right eye: No discharge.         Left eye: No discharge.      Conjunctiva/sclera: Conjunctivae normal.   Cardiovascular:      Rate and Rhythm: Normal rate and regular rhythm.      Pulses: Normal pulses.      Heart sounds: Normal heart sounds. No murmur heard.  Pulmonary:      Effort: No respiratory distress.      Breath sounds: Normal breath sounds. No stridor. No wheezing, rhonchi or rales.   Chest:      Chest wall: No tenderness.   Abdominal:      Palpations: Abdomen is soft.      Tenderness: There is no abdominal tenderness.   Musculoskeletal:      Right lower leg: No edema.      Left lower leg: No edema.   Skin:     Capillary Refill: Capillary refill takes less than 2 seconds.   Neurological:      Mental Status: She is alert.   Psychiatric:         Mood and Affect: Mood normal.         Behavior: Behavior normal.       Administrative Statements   I have spent a total time of 30 minutes on 06/19/24 In caring for this patient including Patient and family education, Documenting in the medical record, Obtaining or reviewing history  , and Communicating with other healthcare professionals .        "

## 2024-06-19 NOTE — ASSESSMENT & PLAN NOTE
LDL elevated at 127  Total cholesterol 213  ASCVD risk 4.7%     Plan:  Discussed lifestyle modifications including diet and exercise  Repeat lipid panel in 6 months

## 2024-07-19 PROBLEM — Z00.00 MEDICARE ANNUAL WELLNESS VISIT, INITIAL: Status: RESOLVED | Noted: 2024-06-19 | Resolved: 2024-07-19

## 2024-11-16 DIAGNOSIS — E03.8 OTHER SPECIFIED HYPOTHYROIDISM: ICD-10-CM

## 2024-11-18 RX ORDER — LEVOTHYROXINE SODIUM 75 UG/1
75 TABLET ORAL DAILY
Qty: 90 TABLET | Refills: 1 | Status: SHIPPED | OUTPATIENT
Start: 2024-11-18

## 2025-05-25 DIAGNOSIS — E03.8 OTHER SPECIFIED HYPOTHYROIDISM: ICD-10-CM

## 2025-05-25 RX ORDER — LEVOTHYROXINE SODIUM 75 UG/1
75 TABLET ORAL DAILY
Qty: 90 TABLET | Refills: 1 | Status: SHIPPED | OUTPATIENT
Start: 2025-05-25

## 2025-05-27 ENCOUNTER — TELEPHONE (OUTPATIENT)
Age: 65
End: 2025-05-27

## 2025-05-27 NOTE — TELEPHONE ENCOUNTER
Isela requires amb referral before ins referral submission. One for yearly mammo, one for Physical Therapy of Left Shoulder.    Please place referral, and route encounter to [facility] Procedure Prior Authorizations POD for completion.        Pt is requesting an insurance referral for the following:      Test Name / Order Name: Physical Therapy of Left Should    DX Code:    Date Of Service:     Location/Facility Name/Address/Phone #: [address], [place] [address]    Location / Facility NPI:     Best Phone # To Reach The Patient: [phone]

## 2025-05-29 NOTE — TELEPHONE ENCOUNTER
Isela called for an update; avised of Dr. Persaud's message.     She stated that the PT referral would be for a new condition as she's experiencing left shoulder pain and range of motion issues. She is also requesting a script for her yearly mammo.    Please contact to advise once referrals placed; Isela is scheduled to follow up on 6/24.

## 2025-06-02 DIAGNOSIS — M25.512 ACUTE PAIN OF LEFT SHOULDER: Primary | ICD-10-CM

## 2025-06-02 DIAGNOSIS — Z12.31 ENCOUNTER FOR SCREENING MAMMOGRAM FOR BREAST CANCER: ICD-10-CM

## 2025-06-02 NOTE — TELEPHONE ENCOUNTER
Pt has medicare a & b so she should not need an insurance referral but she does need the AMB referral faxed to [phone] ASAP as she is in their office right now.

## 2025-06-09 ENCOUNTER — TELEPHONE (OUTPATIENT)
Dept: FAMILY MEDICINE CLINIC | Facility: CLINIC | Age: 65
End: 2025-06-09

## 2025-06-09 NOTE — TELEPHONE ENCOUNTER
Received pw from [facility] that needs to signed and faxed back, placed into the provider folder to be comp

## 2025-06-20 ENCOUNTER — RA CDI HCC (OUTPATIENT)
Dept: OTHER | Facility: HOSPITAL | Age: 65
End: 2025-06-20

## 2025-06-24 ENCOUNTER — OFFICE VISIT (OUTPATIENT)
Dept: FAMILY MEDICINE CLINIC | Facility: CLINIC | Age: 65
End: 2025-06-24
Payer: MEDICARE

## 2025-06-24 VITALS
WEIGHT: 156.4 LBS | HEART RATE: 62 BPM | BODY MASS INDEX: 24.55 KG/M2 | SYSTOLIC BLOOD PRESSURE: 116 MMHG | DIASTOLIC BLOOD PRESSURE: 78 MMHG | OXYGEN SATURATION: 98 % | TEMPERATURE: 98 F | HEIGHT: 67 IN

## 2025-06-24 DIAGNOSIS — E03.9 HYPOTHYROIDISM, UNSPECIFIED TYPE: ICD-10-CM

## 2025-06-24 DIAGNOSIS — Z00.00 ENCOUNTER FOR ANNUAL WELLNESS VISIT (AWV) IN MEDICARE PATIENT: Primary | ICD-10-CM

## 2025-06-24 DIAGNOSIS — N18.2 CKD (CHRONIC KIDNEY DISEASE) STAGE 2, GFR 60-89 ML/MIN: ICD-10-CM

## 2025-06-24 DIAGNOSIS — E78.00 ELEVATED LDL CHOLESTEROL LEVEL: ICD-10-CM

## 2025-06-24 DIAGNOSIS — M85.89 OSTEOPENIA OF MULTIPLE SITES: ICD-10-CM

## 2025-06-24 PROCEDURE — G0439 PPPS, SUBSEQ VISIT: HCPCS

## 2025-06-24 NOTE — ASSESSMENT & PLAN NOTE
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  Lab Results   Component Value Date    EGFR 85 05/30/2024    EGFR 87 01/24/2023    EGFR 67 12/11/2021    CREATININE 0.74 05/30/2024    CREATININE 0.73 01/24/2023    CREATININE 0.92 12/11/2021   No urinary symptoms or changes in urinary patterns    Plan:  - Repeat CMP and CBC    Orders:    CBC and differential; Future    Comprehensive metabolic panel; Future

## 2025-06-24 NOTE — ASSESSMENT & PLAN NOTE
Lab Results   Component Value Date    CHOLESTEROL 213 (H) 05/30/2024    TRIG 98 05/30/2024    HDL 66 05/30/2024    LDLCALC 127 (H) 05/30/2024     Mild total cholesterol and LDL  Patient currently consumes a whole-food, plant-based, diet    Plan:  - Recheck lipid panel  - Encourage continued efforts towards whole-food, plant-based diet  - Encourage regular physical activity with 150 minutes of moderate-vigorous activity per week    Orders:    Lipid panel; Future

## 2025-06-24 NOTE — PROGRESS NOTES
Name: Isela Horton      : 1960      MRN: [id number]  Encounter Provider: Peyton Persaud DO  Encounter Date: 2025   Encounter department: [facility]  :  Assessment & Plan  Encounter for annual wellness visit (AWV) in Medicare patient  Patient presenting for AWV - No acute concerns at this time.   Screening recommendations reviewed.   No SDOH concerns.   Encouraged follow up with dentist and optometrist for routine examinations.   No patient concerns at this time.     Osteopenia of multiple sites  Last DEXA in  with evidence of osteopenia with left femoral neck T-score of -1.5  - No medication management at this time    Plan:  - Encourage weight bearing exercises  - Continue 1000 IU vitamin D and 1200 mg calcium daily  - Repeat DEXA    Orders:    DXA bone density spine hip and pelvis; Future    Elevated LDL cholesterol level  Lab Results   Component Value Date    CHOLESTEROL 213 (H) 2024    TRIG 98 2024    HDL 66 2024    LDLCALC 127 (H) 2024     Mild total cholesterol and LDL  Patient currently consumes a whole-food, plant-based, diet    Plan:  - Recheck lipid panel  - Encourage continued efforts towards whole-food, plant-based diet  - Encourage regular physical activity with 150 minutes of moderate-vigorous activity per week    Orders:    Lipid panel; Future    CKD (chronic kidney disease) stage 2, GFR 60-89 ml/min  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  Lab Results   Component Value Date    EGFR 85 2024    EGFR 87 2023    EGFR 67 2021    CREATININE 0.74 2024    CREATININE 0.73 2023    CREATININE 0.92 2021   No urinary symptoms or changes in urinary patterns    Plan:  - Repeat CMP and CBC    Orders:    CBC and differential; Future    Comprehensive metabolic panel; Future    Hypothyroidism, unspecified type  Lab Results   Component Value Date    [id number] 1.719 2024     FREET4 1.19 01/24/2023   Current medications: levothyroxine 75 mcg    Plan:  -Continue levothyroxine 75 mcg  - Recheck TSH  Orders:    TSH, 3rd generation with Free T4 reflex; Future    Nutrition Assessment and Intervention:     Ordered nutritional assessment labs    Online resources such as NutritionFacts.org, ForksOverKnives.com, plantstrong.com, pcrDreamHeart.Citilog, or similar provided to patient      Emotional and Mental Well-being, Sleep, Connectedness Assessment and Intervention:    Depression and anxiety screening performed and reviewed      Tobacco and Toxic Substance Assessment and Intervention:     Tobacco use screening performed    Alcohol and drug use screening performed       Preventive health issues were discussed with patient, and age appropriate screening tests were ordered as noted in patient's After Visit Summary. Personalized health advice and appropriate referrals for health education or preventive services given if needed, as noted in patient's After Visit Summary.    History of Present Illness     64 yo female presenting to the office for an AWV. She has no concerns at this time. She reports that she had a fall while hiking recently and experienced shoulder pain, which has resolved with PT. Her diet is primarily whole-food and plant based. She expresses desire to get back into exercising routinely now that her shoulder is feeling. Overall, she feels that her health is good.       Patient Care Team:  Peyton Persaud DO as PCP - General (Family Medicine)    Review of Systems   Constitutional:  Negative for activity change and appetite change.   HENT:  Negative for dental problem.    Eyes:  Negative for visual disturbance.   Respiratory:  Negative for shortness of breath.    Cardiovascular:  Negative for chest pain and leg swelling.   Gastrointestinal:  Negative for abdominal pain, constipation, diarrhea and vomiting.   Genitourinary:  Negative for difficulty urinating.   Skin:  Negative for rash.    Neurological:  Negative for syncope and headaches.     Medical History Reviewed by provider this encounter:       Annual Wellness Visit Questionnaire   Isela is here for her Subsequent Wellness visit.     Health Risk Assessment:   Patient rates overall health as good. Patient feels that their physical health rating is same. Patient is very satisfied with their life. Eyesight was rated as slightly worse. Hearing was rated as same. Patient feels that their emotional and mental health rating is slightly better. Patients states they are never, rarely angry. Patient states they are never, rarely unusually tired/fatigued. Pain experienced in the last 7 days has been some. Patient's pain rating has been 1/10. Patient states that she has experienced no weight loss or gain in last 6 months.     Depression Screening:   PHQ-2 Score: 0      Fall Risk Screening:   In the past year, patient has experienced: history of falling in past year    Number of falls: 1  Feels unsteady when standing or walking?: No    Worried about falling?: No      Urinary Incontinence Screening:   Patient has not leaked urine accidently in the last six months.     Home Safety:  Patient does not have trouble with stairs inside or outside of their home. Patient has working smoke alarms and has working carbon monoxide detector. Home safety hazards include: none.     Nutrition:   Current diet is Low Cholesterol, Low Saturated Fat, Limited junk food and Other (please comment). Vegan. Limited oil    Medications:   Patient is currently taking over-the-counter supplements. OTC medications include: see medication list. Patient is able to manage medications.     Activities of Daily Living (ADLs)/Instrumental Activities of Daily Living (IADLs):   Walk and transfer into and out of bed and chair?: Yes  Dress and groom yourself?: Yes    Bathe or shower yourself?: Yes    Feed yourself? Yes  Do your laundry/housekeeping?: Yes  Manage your money, pay your bills and track  your expenses?: Yes  Make your own meals?: Yes    Do your own shopping?: Yes    Durable Medical Equipment Suppliers  None    Previous Hospitalizations:   Any hospitalizations or ED visits within the last 12 months?: No      Advance Care Planning:   Living will: Yes    Durable POA for healthcare: Yes    Advanced directive: Yes      Preventive Screenings      Cardiovascular Screening:    General: Screening Current      Colorectal Cancer Screening:     General: Screening Current      Breast Cancer Screening:     General: Screening Current      Cervical Cancer Screening:    General: Screening Not Indicated      Osteoporosis Screening:    General: Screening Current      Lung Cancer Screening:     General: Screening Not Indicated      Hepatitis C Screening:    General: Screening Current    Immunizations:  - Immunizations due: Prevnar 20    Cardiovascular Risk Assessment:  Patient does not have underlying ASCVD and their cardiovascular risk was assessed today. Their cardiovascular risk factors include: CKD/proteinuria.     The 10-year ASCVD risk score (Essence SAWANT, et al., 2019) is: 4.9%    Values used to calculate the score:      Age: 65 years      Clincally relevant sex: Female      Is Non- : No      Diabetic: No      Tobacco smoker: No      Systolic Blood Pressure: 116 mmHg      Is BP treated: No      HDL Cholesterol: 66 mg/dL      Total Cholesterol: 213 mg/dL    Screening, Brief Intervention, and Referral to Treatment (SBIRT)     Screening  Typical number of drinks in a day: 0  Typical number of drinks in a week: 4  Interpretation: Low risk drinking behavior.    AUDIT-C Screenin) How often did you have a drink containing alcohol in the past year? 4 or more times a week  2) How many drinks did you have on a typical day when you were drinking in the past year? 0  3) How often did you have 6 or more drinks on one occasion in the past year? never    AUDIT-C Score: 4  Interpretation: Score 3-12  (female): POSITIVE screen for alcohol misuse    AUDIT Screenin) How often during the last year have you found that you were not able to stop drinking once you had started? 0 - never  5) How often during the last year have you failed to do what was normally expected from you because of drinking? 0 - never  6) How often during the last year have you needed a first drink in the morning to get yourself going after a heavy drinking session? 0 - never  7) How often during the last year have you had a feeling of guilt or remorse after drinking? 1 - less than monthly  8) How often during the last year have you been unable to remember what happened the night before because you had been drinking? 0 - never  9) Have you or someone else been injured as a result of your drinking? 0 - no  10) Has a relative or friend or a doctor or another health worker been concerned about your drinking or suggested you cut down? 0 - no    AUDIT Score: 5  Interpretation: Low risk alcohol consumption    Single Item Drug Screening:  How often have you used an illegal drug (including marijuana) or a prescription medication for non-medical reasons in the past year? never    Single Item Drug Screen Score: 0  Interpretation: Negative screen for possible drug use disorder    Other Counseling Topics:   Regular weightbearing exercise and calcium and vitamin D intake.     Social Drivers of Health     Food Insecurity: No Food Insecurity (2025)    Nursing - Inadequate Food Risk Classification     Worried About Running Out of Food in the Last Year: Never true     Ran Out of Food in the Last Year: Never true   Transportation Needs: No Transportation Needs (2025)    PRAPARE - Transportation     Lack of Transportation (Medical): No     Lack of Transportation (Non-Medical): No   Housing Stability: Low Risk  (2025)    Housing Stability Vital Sign     Unable to Pay for Housing in the Last Year: No     Number of Times Moved in the Last Year: 0  "    Homeless in the Last Year: No   Utilities: Not At Risk (6/19/2025)    [facility] Utilities     Threatened with loss of utilities: No     No results found.    Objective   /78 (BP Location: Left arm, Patient Position: Sitting, Cuff Size: Standard)   Pulse 62   Temp 98 °F (36.7 °C) (Tympanic)   Ht 5' 6.54\" (1.69 m)   Wt 70.9 kg (156 lb 6.4 oz)   SpO2 98%   BMI 24.84 kg/m²     Physical Exam  Vitals reviewed.   Constitutional:       General: She is not in acute distress.     Appearance: She is not ill-appearing or diaphoretic.   HENT:      Mouth/Throat:      Mouth: Mucous membranes are moist.      Pharynx: Oropharynx is clear.     Cardiovascular:      Rate and Rhythm: Normal rate and regular rhythm.   Pulmonary:      Effort: Pulmonary effort is normal. No respiratory distress.      Breath sounds: Normal breath sounds. No wheezing or rhonchi.   Abdominal:      Palpations: Abdomen is soft.      Tenderness: There is no abdominal tenderness. There is no guarding or rebound.     Musculoskeletal:      Right lower leg: No edema.      Left lower leg: No edema.     Skin:     General: Skin is warm and dry.     Psychiatric:         Behavior: Behavior normal.         "

## 2025-06-24 NOTE — PATIENT INSTRUCTIONS
Medicare Preventive Visit Patient Instructions  Thank you for completing your Welcome to Medicare Visit or Medicare Annual Wellness Visit today. Your next wellness visit will be due in one year (6/25/2026).  The screening/preventive services that you may require over the next 5-10 years are detailed below. Some tests may not apply to you based off risk factors and/or age. Screening tests ordered at today's visit but not completed yet may show as past due. Also, please note that scanned in results may not display below.  Preventive Screenings:  Service Recommendations Previous Testing/Comments   Colorectal Cancer Screening  * Colonoscopy    * Fecal Occult Blood Test (FOBT)/Fecal Immunochemical Test (FIT)  * Fecal DNA/Cologuard Test  * Flexible Sigmoidoscopy Age: 45-75 years old   Colonoscopy: every 10 years (may be performed more frequently if at higher risk)  OR  FOBT/FIT: every 1 year  OR  Cologuard: every 3 years  OR  Sigmoidoscopy: every 5 years  Screening may be recommended earlier than age 45 if at higher risk for colorectal cancer. Also, an individualized decision between you and your healthcare provider will decide whether screening between the ages of 76-85 would be appropriate. Colonoscopy: 05/11/2023  FOBT/FIT: Not on file  Cologuard: Not on file  Sigmoidoscopy: Not on file    Screening Current     Breast Cancer Screening Age: 40+ years old  Frequency: every 1-2 years  Not required if history of left and right mastectomy Mammogram: 05/24/2024    Screening Current   Cervical Cancer Screening Between the ages of 21-29, pap smear recommended once every 3 years.   Between the ages of 30-65, can perform pap smear with HPV co-testing every 5 years.   Recommendations may differ for women with a history of total hysterectomy, cervical cancer, or abnormal pap smears in past. Pap Smear: Not on file    Screening Not Indicated   Hepatitis C Screening Once for adults born between 1945 and 1965  More frequently in  patients at high risk for Hepatitis C Hep C Antibody: 01/24/2023    Screening Current   Diabetes Screening 1-2 times per year if you're at risk for diabetes or have pre-diabetes Fasting glucose: 89 mg/dL (5/30/2024)  A1C: 4.6 (1/18/2022)      Cholesterol Screening Once every 5 years if you don't have a lipid disorder. May order more often based on risk factors. Lipid panel: 05/30/2024    Screening Current     Other Preventive Screenings Covered by Medicare:  Abdominal Aortic Aneurysm (AAA) Screening: covered once if your at risk. You're considered to be at risk if you have a family history of AAA.  Lung Cancer Screening: covers low dose CT scan once per year if you meet all of the following conditions: (1) Age 55-77; (2) No signs or symptoms of lung cancer; (3) Current smoker or have quit smoking within the last 15 years; (4) You have a tobacco smoking history of at least 20 pack years (packs per day multiplied by number of years you smoked); (5) You get a written order from a healthcare provider.  Glaucoma Screening: covered annually if you're considered high risk: (1) You have diabetes OR (2) Family history of glaucoma OR (3)  aged 50 and older OR (4)  American aged 65 and older  Osteoporosis Screening: covered every 2 years if you meet one of the following conditions: (1) You're estrogen deficient and at risk for osteoporosis based off medical history and other findings; (2) Have a vertebral abnormality; (3) On glucocorticoid therapy for more than 3 months; (4) Have primary hyperparathyroidism; (5) On osteoporosis medications and need to assess response to drug therapy.   Last bone density test (DXA Scan): 05/05/2023.  HIV Screening: covered annually if you're between the age of 15-65. Also covered annually if you are younger than 15 and older than 65 with risk factors for HIV infection. For pregnant patients, it is covered up to 3 times per pregnancy.    Immunizations:  Immunization  Recommendations   Influenza Vaccine Annual influenza vaccination during flu season is recommended for all persons aged >= 6 months who do not have contraindications   Pneumococcal Vaccine   * Pneumococcal conjugate vaccine = PCV13 (Prevnar 13), PCV15 (Vaxneuvance), PCV20 (Prevnar 20)  * Pneumococcal polysaccharide vaccine = PPSV23 (Pneumovax) Adults 19-63 yo with certain risk factors or if 65+ yo  If never received any pneumonia vaccine: recommend Prevnar 20 (PCV20)  Give PCV20 if previously received 1 dose of PCV13 or PPSV23   Hepatitis B Vaccine 3 dose series if at intermediate or high risk (ex: diabetes, end stage renal disease, liver disease)   Respiratory syncytial virus (RSV) Vaccine - COVERED BY MEDICARE PART D  * RSVPreF3 (Arexvy) CDC recommends that adults 60 years of age and older may receive a single dose of RSV vaccine using shared clinical decision-making (SCDM)   Tetanus (Td) Vaccine - COST NOT COVERED BY MEDICARE PART B Following completion of primary series, a booster dose should be given every 10 years to maintain immunity against tetanus. Td may also be given as tetanus wound prophylaxis.   Tdap Vaccine - COST NOT COVERED BY MEDICARE PART B Recommended at least once for all adults. For pregnant patients, recommended with each pregnancy.   Shingles Vaccine (Shingrix) - COST NOT COVERED BY MEDICARE PART B  2 shot series recommended in those 19 years and older who have or will have weakened immune systems or those 50 years and older     Health Maintenance Due:      Topic Date Due   • Breast Cancer Screening: Mammogram  05/24/2025   • DXA SCAN  05/05/2028   • Colorectal Cancer Screening  05/09/2028   • Hepatitis C Screening  Completed     Immunizations Due:      Topic Date Due   • Pneumococcal Vaccine: 50+ Years (1 of 1 - PCV) Never done   • COVID-19 Vaccine (6 - 2024-25 season) 09/01/2024   • Influenza Vaccine (Season Ended) 09/01/2025     Advance Directives   What are advance directives?  Advance  directives are legal documents that state your wishes and plans for medical care. These plans are made ahead of time in case you lose your ability to make decisions for yourself. Advance directives can apply to any medical decision, such as the treatments you want, and if you want to donate organs.   What are the types of advance directives?  There are many types of advance directives, and each state has rules about how to use them. You may choose a combination of any of the following:  Living will:  This is a written record of the treatment you want. You can also choose which treatments you do not want, which to limit, and which to stop at a certain time. This includes surgery, medicine, IV fluid, and tube feedings.   Durable power of  for healthcare (DPAHC):  This is a written record that states who you want to make healthcare choices for you when you are unable to make them for yourself. This person, called a proxy, is usually a family member or a friend. You may choose more than 1 proxy.  Do not resuscitate (DNR) order:  A DNR order is used in case your heart stops beating or you stop breathing. It is a request not to have certain forms of treatment, such as CPR. A DNR order may be included in other types of advance directives.  Medical directive:  This covers the care that you want if you are in a coma, near death, or unable to make decisions for yourself. You can list the treatments you want for each condition. Treatment may include pain medicine, surgery, blood transfusions, dialysis, IV or tube feedings, and a ventilator (breathing machine).  Values history:  This document has questions about your views, beliefs, and how you feel and think about life. This information can help others choose the care that you would choose.  Why are advance directives important?  An advance directive helps you control your care. Although spoken wishes may be used, it is better to have your wishes written down. Spoken  "wishes can be misunderstood, or not followed. Treatments may be given even if you do not want them. An advance directive may make it easier for your family to make difficult choices about your care.   Fall Prevention    Fall prevention  includes ways to make your home and other areas safer. It also includes ways you can move more carefully to prevent a fall. Health conditions that cause changes in your blood pressure, vision, or muscle strength and coordination may increase your risk for falls. Medicines may also increase your risk for falls if they make you dizzy, weak, or sleepy.   Fall prevention tips:   Stand or sit up slowly.    Use assistive devices as directed.    Wear shoes that fit well and have soles that .    Wear a personal alarm.    Stay active.    Manage your medical conditions.    Home Safety Tips:  Add items to prevent falls in the bathroom.    Keep paths clear.    Install bright lights in your home.    Keep items you use often on shelves within reach.    Paint or place reflective tape on the edges of your stairs.    Alcohol Use and Your Health    Drinking too much can harm your health.  Excessive alcohol use leads to about 88,000 death in the [place] each year, and shortens the life of those who diet by almost 30 years.  Further, excessive drinking cost the economy $249 billion in 2010.  Most excessive drinkers are not alcohol dependent.    Excessive alcohol use has immediate effects that increase the risk of many harmful health conditions.  These are most often the result of binge drinking.  Over time, excessive alcohol use can lead to the development of chronic diseases and other series health problems.    What is considered a \"drink\"?        Excessive alcohol use includes:  Binge Drinking: For women, 4 or more drinks consumed on one occasion. For men, 5 or more drinks consumed on one occasion.  Heavy Drinking: For women, 8 or more drinks per week. For men, 15 or more drinks per " week  Any alcohol used by pregnant women  Any alcohol used by those under the age of 21 years    If you choose to drink, do so in moderation:  Do not drink at all if you are under the age of 21, or if you are or may be pregnant, or have health problems that could be made worse by drinking.  For women, up to 1 drink per day  For men, up to 2 drinks a day    No one should begin drinking or drink more frequently based on potential health benefits    Short-Term Health Risks:  Injuries: motor vehicle crashes, falls, drownings, burns  Violence: homicide, suicide, sexual assault, intimate partner violence  Alcohol poisoning  Reproductive health: risky sexual behaviors, unintended prengnacy, sexually transmitted diseases, miscarriage, stillbirth, fetal alcohol syndrome    Long-Term Health Risks:  Chronic diseases: high blood pressure, heart disease, stroke, liver disease, digestive problems  Cancers: breast, mouth and throat, liver, colon  Learning and memory problems: dementia, poor school performance  Mental health: depression, anxiety, insomnia  Social problems: lost productivity, family problems, unemployment  Alcohol dependence    For support and more information:  Substance Abuse and Mental Health Services Administration  [address]  [place] [address]  Web Address: http://www.samhsa.gov    Alcoholics Anonymous        Web Address: http://www.aa.org    https://www.cdc.gov/alcohol/fact-sheets/alcohol-use.htm   © Copyright [employer] 2018 Information is for End User's use only and may not be sold, redistributed or otherwise used for commercial purposes. All illustrations and images included in CareNotes® are the copyrighted property of A.D.A.M., Inc. or [employer]

## 2025-06-24 NOTE — ASSESSMENT & PLAN NOTE
Last DEXA in 2023 with evidence of osteopenia with left femoral neck T-score of -1.5  - No medication management at this time    Plan:  - Encourage weight bearing exercises  - Continue 1000 IU vitamin D and 1200 mg calcium daily  - Repeat DEXA    Orders:    DXA bone density spine hip and pelvis; Future

## 2025-06-24 NOTE — ASSESSMENT & PLAN NOTE
Lab Results   Component Value Date    [id number] 1.719 05/30/2024    FREET4 1.19 01/24/2023   Current medications: levothyroxine 75 mcg    Plan:  -Continue levothyroxine 75 mcg  - Recheck TSH  Orders:    TSH, 3rd generation with Free T4 reflex; Future

## 2025-07-08 ENCOUNTER — TELEPHONE (OUTPATIENT)
Age: 65
End: 2025-07-08

## 2025-07-08 NOTE — TELEPHONE ENCOUNTER
Salomon ferreira [facility] physical therapy is asking for an updated Physical therapy script to be sent their way for Isela's Left Shoulder.    Fax:[phone]  Phone:[phone] ([facility])

## 2025-07-09 DIAGNOSIS — M25.519 SHOULDER PAIN, UNSPECIFIED CHRONICITY, UNSPECIFIED LATERALITY: Primary | ICD-10-CM

## 2025-07-10 ENCOUNTER — HOSPITAL ENCOUNTER (OUTPATIENT)
Facility: HOSPITAL | Age: 65
End: 2025-07-10
Payer: MEDICARE

## 2025-07-10 DIAGNOSIS — Z12.31 ENCOUNTER FOR SCREENING MAMMOGRAM FOR BREAST CANCER: ICD-10-CM

## 2025-07-10 PROCEDURE — 77067 SCR MAMMO BI INCL CAD: CPT

## 2025-07-10 PROCEDURE — 77063 BREAST TOMOSYNTHESIS BI: CPT

## 2025-08-08 ENCOUNTER — TELEPHONE (OUTPATIENT)
Age: 65
End: 2025-08-08